# Patient Record
Sex: FEMALE | Race: WHITE | NOT HISPANIC OR LATINO | Employment: FULL TIME | URBAN - METROPOLITAN AREA
[De-identification: names, ages, dates, MRNs, and addresses within clinical notes are randomized per-mention and may not be internally consistent; named-entity substitution may affect disease eponyms.]

---

## 2017-02-23 ENCOUNTER — ALLSCRIPTS OFFICE VISIT (OUTPATIENT)
Dept: OTHER | Facility: OTHER | Age: 42
End: 2017-02-23

## 2017-09-07 ENCOUNTER — GENERIC CONVERSION - ENCOUNTER (OUTPATIENT)
Dept: OTHER | Facility: OTHER | Age: 42
End: 2017-09-07

## 2017-09-07 ENCOUNTER — ALLSCRIPTS OFFICE VISIT (OUTPATIENT)
Dept: OTHER | Facility: OTHER | Age: 42
End: 2017-09-07

## 2018-01-12 VITALS
HEIGHT: 65 IN | HEART RATE: 92 BPM | WEIGHT: 243.6 LBS | TEMPERATURE: 98.5 F | BODY MASS INDEX: 40.59 KG/M2 | SYSTOLIC BLOOD PRESSURE: 132 MMHG | DIASTOLIC BLOOD PRESSURE: 74 MMHG

## 2018-01-22 VITALS
DIASTOLIC BLOOD PRESSURE: 84 MMHG | BODY MASS INDEX: 41.09 KG/M2 | HEART RATE: 74 BPM | HEIGHT: 65 IN | SYSTOLIC BLOOD PRESSURE: 130 MMHG | WEIGHT: 246.6 LBS | TEMPERATURE: 97.3 F

## 2018-01-23 NOTE — PROGRESS NOTES
Assessment    1  Right knee pain (719 46) (M25 561)   2  Iliotibial band syndrome of right side (548 89) (M76 31)    Plan  Depression with anxiety    · Citalopram Hydrobromide 20 MG Oral Tablet; take 1 tablet by mouth daily  Right knee pain    · *1 - [facility] (ORTHOPEDIC SURGERY )  Co-Management  *  Status: Active  Requested for: [id number]  Care Summary provided  : Yes    Discussion/Summary    Right knee pain possible anserine bursitis vs ligamental tear/injury  I do feel there may be a IT band syndrome component given location of hip and leg pain and is separate from the knee pain  Ortho referral would be best and pt agreeable  The treatment plan was reviewed with the patient/guardian  The patient/guardian understands and agrees with the treatment plan      Chief Complaint  Pt is here with c/o knee pain  History of Present Illness  HPI: 2 months ago joined [employer]  Had been doing sprinting and squatting  Has pain in right hip down right leg into knee  Has not been working out  Keeps her up at night  Sharp stabbing pain  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  Sitting too long makes it stiff  Difficulty going up and down steps  has some swelling  No redness or warmth  Pain in located outside of right knee  Difficulty getting pants on  Review of Systems    Constitutional: no fever and no chills  Musculoskeletal: as noted in HPI  Active Problems    1  Abscess of groin (682 2) (L02 214)   2  Acute gastroenteritis (558 9) (K52 9)   3  Dental disorder (525 9) (K08 9)   4  Depression with anxiety (300 4) (F41 8)   5  Encounter for screening mammogram for malignant neoplasm of breast (V76 12)   (Z12 31)   6  Flu vaccine need (V04 81) (Z23)   7  Viral infection (079 99) (B34 9)    Past Medical History    1  Denied: History of Alcohol abuse   2  Dental disorder (525 9) (K08 9)   3  Denied: History of substance abuse   4   Denied: History of Mental health problem 5  Viral infection (152 47) (B34 9)    Family History  Mother    1  Denied: Family history of Alcohol abuse   2  Denied: Family history of substance abuse   3  Family history of Living and Healthy   4  Denied: Family history of Mental health problem  Father    5  Denied: Family history of Alcohol abuse   6  Denied: Family history of substance abuse   7  Denied: Family history of Mental health problem    Social History    · Never a smoker   · No drug use   · Remote social alcohol use  The social history was reviewed and updated today  The social history was reviewed and is unchanged  Surgical History    1  History of  Section    Current Meds   1  Citalopram Hydrobromide 20 MG Oral Tablet; take 1 tablet by mouth daily; Therapy: 83STL8231 to (Evaluate:2018)  Requested for: 72LZG0432; Last   Rx:[id number] Ordered    The medication list was reviewed and updated today  Allergies    1  No Known Drug Allergies    Vitals   Recorded: 2017 10:42AM   Temperature 97 3 F, Tympanic   Heart Rate 74, L Radial   Pulse Quality Regular, L Radial   Systolic 040, LUE, Sitting   Diastolic 84, LUE, Sitting   Height 5 ft 5 in   Weight 246 lb 9 6 oz   BMI Calculated 41 04   BSA Calculated 2 16     Physical Exam    Constitutional   General appearance: No acute distress, well appearing and well nourished  Musculoskeletal   Gait and station: Normal     Joints, bones, and muscles: Abnormal   Appearance - right knee and prepatellar medial swelling  swelling, but no right knee erythema, no right knee ecchymosis and no right knee deformity  Palpation - right knee and Located lateral aspect  tenderness, but no right knee increased warmth, no right knee click and no right knee crepitus  Range of motion: Abnormal   Range of Motion: Right Knee:  Full except as noted  Flexion: painful restricted AROM  Pain witl valgus and varus stress  Stability: Normal   no instability problems     Psychiatric   Orientation to person, place, and time: Normal     Mood and affect: Normal        Signatures   Electronically signed by : Yolanda Pulliam; Sep  7 2017 11:43AM EST                       (Author)    Electronically signed by : Yolette Garcia DO; Sep  7 2017 12:04PM EST                       (Author)

## 2018-06-08 DIAGNOSIS — F32.A DEPRESSION, UNSPECIFIED DEPRESSION TYPE: Primary | ICD-10-CM

## 2018-06-08 RX ORDER — CITALOPRAM 20 MG/1
1 TABLET ORAL DAILY
COMMUNITY
Start: 2015-02-17 | End: 2018-06-08 | Stop reason: SDUPTHER

## 2018-06-08 RX ORDER — CITALOPRAM 20 MG/1
20 TABLET ORAL DAILY
Qty: 30 TABLET | Refills: 0 | Status: SHIPPED | OUTPATIENT
Start: 2018-06-08 | End: 2018-07-26 | Stop reason: SDUPTHER

## 2018-06-08 NOTE — TELEPHONE ENCOUNTER
Pt didn't realize they had no refills left of citalopram, they ran out today, could we please renew to [facility] phone [phone]    DINAH AT [employer] making a f/u apt with us vinicius

## 2018-07-25 DIAGNOSIS — F32.A DEPRESSION, UNSPECIFIED DEPRESSION TYPE: ICD-10-CM

## 2018-07-25 RX ORDER — CITALOPRAM 20 MG/1
20 TABLET ORAL DAILY
Qty: 7 TABLET | Refills: 0 | Status: CANCELLED | OUTPATIENT
Start: 2018-07-25

## 2018-07-25 NOTE — TELEPHONE ENCOUNTER
Could we please send an emergency supply of citalopram to [facility]?  Pt took her last one today and her f/u is monday

## 2018-07-26 DIAGNOSIS — F32.A DEPRESSION, UNSPECIFIED DEPRESSION TYPE: ICD-10-CM

## 2018-07-26 RX ORDER — CITALOPRAM 20 MG/1
20 TABLET ORAL DAILY
Qty: 30 TABLET | Refills: 0 | Status: CANCELLED | OUTPATIENT
Start: 2018-07-26

## 2018-07-26 RX ORDER — CITALOPRAM 20 MG/1
20 TABLET ORAL DAILY
Qty: 30 TABLET | Refills: 0 | Status: SHIPPED | OUTPATIENT
Start: 2018-07-26 | End: 2018-09-12 | Stop reason: SDUPTHER

## 2018-07-26 NOTE — TELEPHONE ENCOUNTER
MEDICATION REFILL REQUEST WAS SENT TO [facility] - SHE IS OUT FOR THE WEEK - CAN WE PLEASE SEND TO OTHER PROVIDER TO APPROVE SINCE THE PATIENT IS OUT

## 2018-09-12 ENCOUNTER — OFFICE VISIT (OUTPATIENT)
Dept: FAMILY MEDICINE CLINIC | Facility: HOSPITAL | Age: 43
End: 2018-09-12
Payer: COMMERCIAL

## 2018-09-12 VITALS
SYSTOLIC BLOOD PRESSURE: 136 MMHG | HEART RATE: 78 BPM | BODY MASS INDEX: 42.2 KG/M2 | HEIGHT: 66 IN | WEIGHT: 262.6 LBS | DIASTOLIC BLOOD PRESSURE: 76 MMHG | TEMPERATURE: 97.5 F

## 2018-09-12 DIAGNOSIS — M25.561 ACUTE PAIN OF RIGHT KNEE: ICD-10-CM

## 2018-09-12 DIAGNOSIS — Z13.6 SCREENING FOR CARDIOVASCULAR CONDITION: ICD-10-CM

## 2018-09-12 DIAGNOSIS — Z12.31 ENCOUNTER FOR SCREENING MAMMOGRAM FOR MALIGNANT NEOPLASM OF BREAST: Primary | ICD-10-CM

## 2018-09-12 DIAGNOSIS — F41.1 GENERALIZED ANXIETY DISORDER: ICD-10-CM

## 2018-09-12 PROCEDURE — 3008F BODY MASS INDEX DOCD: CPT | Performed by: FAMILY MEDICINE

## 2018-09-12 PROCEDURE — 99213 OFFICE O/P EST LOW 20 MIN: CPT | Performed by: FAMILY MEDICINE

## 2018-09-12 RX ORDER — CITALOPRAM 40 MG/1
40 TABLET ORAL DAILY
Qty: 30 TABLET | Refills: 2 | Status: SHIPPED | OUTPATIENT
Start: 2018-09-12 | End: 2018-12-13 | Stop reason: SDUPTHER

## 2018-09-12 NOTE — PROGRESS NOTES
Assessment/Plan:    Problem List Items Addressed This Visit        Other    Acute pain of right knee    Relevant Orders    Ambulatory referral to Orthopedic Surgery    Generalized anxiety disorder    Relevant Medications    citalopram (CeleXA) 40 mg tablet    Other Relevant Orders    CBC    Comprehensive metabolic panel    TSH, 3rd generation with Free T4 reflex      Other Visit Diagnoses     Encounter for screening mammogram for malignant neoplasm of breast    -  Primary    Relevant Orders    Mammo screening bilateral w cad    Screening for cardiovascular condition        Relevant Orders    Lipid panel    CBC    Comprehensive metabolic panel        Ongoing KEVIN sytmpoms  Discussed options and agreed on a higher dose of celexa  No si/hi  Feeling safe  reeval in 3 months  Labs as outlined  Referral to Orthopedics as requested for ongoing right knee pain  No Follow-up on file  To call our office if any concerns/questions at [phone]   ______________________________________________________________________          Patient is here for follow up of chronic conditions  Chief Complaint   Patient presents with    Follow-up     Med check    Diarrhea    Nausea    Abdominal Pain       History of Present Illness:   Here for KEVIN  Has been on celexa and doing relatively well  However recently with more stressors at home and stressors at work her anxiety and nervousness have been uncontrolled  No depressed feelings, no si/hi  With ongoing pain in the left knee  Was not able to see orthopedics as recommended previously  With three days of nausea and diarrhea  diarhea ongoing but improved today  No fever, no chills  Feels feverish  No vomiting  No new food/food poisoning  No sick contacts  Review of Systems   Constitutional: Negative  Negative for activity change, appetite change, chills, diaphoresis, fatigue and fever     HENT: Negative for congestion, facial swelling and sore throat  Respiratory: Negative  Negative for apnea, cough, chest tightness and shortness of breath  Cardiovascular: Negative  Negative for chest pain and palpitations  Gastrointestinal: Positive for diarrhea and nausea  Negative for abdominal distention, abdominal pain, blood in stool and constipation  Genitourinary: Negative  Negative for difficulty urinating, dysuria, flank pain and frequency  Social History     Social History    Marital status: Single     Spouse name: N/A    Number of children: N/A    Years of education: N/A     Occupational History    Not on file  Social History Main Topics    Smoking status: Never Smoker    Smokeless tobacco: Never Used    Alcohol use Yes      Comment: Remote social alcohol use     Drug use: No    Sexual activity: Not on file     Other Topics Concern    Not on file     Social History Narrative    No narrative on file               No Known Allergies    There is no immunization history for the selected administration types on file for this patient  Vitals:    09/12/18 0825   BP: 136/76   Pulse: 78   Temp: 97 5 °F (36 4 °C)     Body mass index is 43 03 kg/m²  Physical Exam   Constitutional: She appears well-developed and well-nourished  No distress  HENT:   Head: Normocephalic and atraumatic  Eyes: Conjunctivae and EOM are normal  Pupils are equal, round, and reactive to light  Neck: Normal range of motion  Neck supple  Cardiovascular: Normal rate, regular rhythm and normal heart sounds  Exam reveals no gallop and no friction rub  No murmur heard  Pulmonary/Chest: Effort normal and breath sounds normal  No respiratory distress  She has no wheezes  She has no rales  Abdominal: Soft  Bowel sounds are normal  She exhibits no distension and no mass  There is no tenderness  There is no rebound and no guarding  No hernia  Skin: Skin is warm and dry  Capillary refill takes less than 2 seconds  No rash noted   She is not diaphoretic  No erythema  Psychiatric: She has a normal mood and affect  Nursing note and vitals reviewed      Diabetic Foot Exam                Health Maintenance Due   Topic Date Due    DTaP,Tdap,and Td Vaccines (1 - Tdap) 08/19/1995    PAP SMEAR  08/19/1995    MAMMOGRAM  05/03/2013    INFLUENZA VACCINE  09/01/2018

## 2018-09-12 NOTE — LETTER
September 12, 2018     Patient: Azalia Cerda   YOB: 1975   Date of Visit: 9/12/2018       To Whom it May Concern:    John Matthews is under my professional care  She was seen in my office on 9/12/2018  She may return to work on 9/13/2018  Please excuse her from work from September 10-12, 2018  If you have any questions or concerns, please don't hesitate to call           Sincerely,          Dena Terrazas MD        CC: No Recipients

## 2018-09-14 ENCOUNTER — TELEPHONE (OUTPATIENT)
Dept: FAMILY MEDICINE CLINIC | Facility: HOSPITAL | Age: 43
End: 2018-09-14

## 2018-09-14 LAB
ALBUMIN SERPL-MCNC: 4.2 G/DL (ref 3.6–5.1)
ALBUMIN/GLOB SERPL: 1.8 (CALC) (ref 1–2.5)
ALP SERPL-CCNC: 52 U/L (ref 33–115)
ALT SERPL-CCNC: 8 U/L (ref 6–29)
AST SERPL-CCNC: 10 U/L (ref 10–30)
BILIRUB SERPL-MCNC: 0.4 MG/DL (ref 0.2–1.2)
BUN SERPL-MCNC: 12 MG/DL (ref 7–25)
BUN/CREAT SERPL: ABNORMAL (CALC) (ref 6–22)
CALCIUM SERPL-MCNC: 9.4 MG/DL (ref 8.6–10.2)
CHLORIDE SERPL-SCNC: 106 MMOL/L (ref 98–110)
CHOLEST SERPL-MCNC: 218 MG/DL
CHOLEST/HDLC SERPL: 4.2 (CALC)
CO2 SERPL-SCNC: 28 MMOL/L (ref 20–32)
CREAT SERPL-MCNC: 0.84 MG/DL (ref 0.5–1.1)
ERYTHROCYTE [DISTWIDTH] IN BLOOD BY AUTOMATED COUNT: 12.9 % (ref 11–15)
GLOBULIN SER CALC-MCNC: 2.3 G/DL (CALC) (ref 1.9–3.7)
GLUCOSE SERPL-MCNC: 102 MG/DL (ref 65–99)
HCT VFR BLD AUTO: 35.9 % (ref 35–45)
HDLC SERPL-MCNC: 52 MG/DL
HGB BLD-MCNC: 11.5 G/DL (ref 11.7–15.5)
LDLC SERPL CALC-MCNC: 143 MG/DL (CALC)
MCH RBC QN AUTO: 28.5 PG (ref 27–33)
MCHC RBC AUTO-ENTMCNC: 32 G/DL (ref 32–36)
MCV RBC AUTO: 88.9 FL (ref 80–100)
NONHDLC SERPL-MCNC: 166 MG/DL (CALC)
PLATELET # BLD AUTO: 314 THOUSAND/UL (ref 140–400)
PMV BLD REES-ECKER: 10 FL (ref 7.5–12.5)
POTASSIUM SERPL-SCNC: 4.6 MMOL/L (ref 3.5–5.3)
PROT SERPL-MCNC: 6.5 G/DL (ref 6.1–8.1)
RBC # BLD AUTO: 4.04 MILLION/UL (ref 3.8–5.1)
SL AMB EGFR AFRICAN AMERICAN: 98 ML/MIN/1.73M2
SL AMB EGFR NON AFRICAN AMERICAN: 85 ML/MIN/1.73M2
SODIUM SERPL-SCNC: 141 MMOL/L (ref 135–146)
TRIGL SERPL-MCNC: 115 MG/DL
TSH SERPL-ACNC: 0.75 MIU/L
WBC # BLD AUTO: 6.1 THOUSAND/UL (ref 3.8–10.8)

## 2018-09-17 ENCOUNTER — ANNUAL EXAM (OUTPATIENT)
Dept: FAMILY MEDICINE CLINIC | Facility: HOSPITAL | Age: 43
End: 2018-09-17
Payer: COMMERCIAL

## 2018-09-17 VITALS
BODY MASS INDEX: 41.85 KG/M2 | WEIGHT: 260.4 LBS | DIASTOLIC BLOOD PRESSURE: 70 MMHG | TEMPERATURE: 97.8 F | SYSTOLIC BLOOD PRESSURE: 102 MMHG | HEIGHT: 66 IN | HEART RATE: 82 BPM

## 2018-09-17 DIAGNOSIS — Z23 FLU VACCINE NEED: ICD-10-CM

## 2018-09-17 DIAGNOSIS — Z23 NEED FOR TETANUS, DIPHTHERIA, AND ACELLULAR PERTUSSIS (TDAP) VACCINE: Primary | ICD-10-CM

## 2018-09-17 DIAGNOSIS — Z01.419 ENCOUNTER FOR GYNECOLOGICAL EXAMINATION WITHOUT ABNORMAL FINDING: ICD-10-CM

## 2018-09-17 PROCEDURE — 90471 IMMUNIZATION ADMIN: CPT

## 2018-09-17 PROCEDURE — 99396 PREV VISIT EST AGE 40-64: CPT | Performed by: FAMILY MEDICINE

## 2018-09-17 PROCEDURE — 90472 IMMUNIZATION ADMIN EACH ADD: CPT

## 2018-09-17 PROCEDURE — 90715 TDAP VACCINE 7 YRS/> IM: CPT

## 2018-09-17 PROCEDURE — 90686 IIV4 VACC NO PRSV 0.5 ML IM: CPT

## 2018-09-17 NOTE — LETTER
September 17, 2018     Patient: Krysta Borjas   YOB: 1975   Date of Visit: 9/17/2018       To Whom it May Concern:    Carola Mcintosh is under my professional care  She was seen in my office on 9/17/2018  She may return to work on 9/17/2018  If you have any questions or concerns, please don't hesitate to call           Sincerely,          Raven Maya MD        CC: No Recipients

## 2018-09-17 NOTE — PROGRESS NOTES
Assessment/Plan:        Problem List Items Addressed This Visit     None      Visit Diagnoses     Need for tetanus, diphtheria, and acellular pertussis (Tdap) vaccine    -  Primary    Relevant Orders    TDAP VACCINE GREATER THAN OR EQUAL TO 8YO IM (Completed)    Flu vaccine need        Relevant Orders    SYRINGE/SINGLE-DOSE VIAL: influenza vaccine, 5163-3228, quadrivalent, 0 5 mL, preservative-free, for patients 3+ yr (FLUZONE) (Completed)    Encounter for gynecological examination without abnormal finding            Send pap/hpv  If negative repeat in 5 years  To call our office if any concerns/questions at [phone]       ______________________________________________________________      Chief Complaint   Patient presents with    Gynecologic Exam       History of Present Illness:  Here for follow up of      Had 2 c/s  No hx of ovarian or uterine problems  No hx of abnormal Pap smears  Fam Hx of gmother with ovarian cancer  Will be scheduling for mammogram    LMP was a few weeks ago  Normal menstrual cycle without dysmenorrhea  Heavy in the first few days  Review of Systems   Respiratory: Negative  Cardiovascular: Negative  Genitourinary: Negative for decreased urine volume, difficulty urinating, dyspareunia, genital sores, hematuria, menstrual problem, pelvic pain, urgency, vaginal bleeding, vaginal discharge and vaginal pain  Social History     Social History    Marital status: Single     Spouse name: N/A    Number of children: N/A    Years of education: N/A     Occupational History    Not on file       Social History Main Topics    Smoking status: Never Smoker    Smokeless tobacco: Never Used    Alcohol use Yes      Comment: Remote social alcohol use     Drug use: No    Sexual activity: Not on file     Other Topics Concern    Not on file     Social History Narrative    No narrative on file               No Known Allergies        Vitals:    18 0840   BP: 102/70   Pulse: 82   Temp: 97 8 °F (36 6 °C)     Body mass index is 42 67 kg/m²  Physical Exam   Constitutional: She appears well-developed and well-nourished  Genitourinary: Rectum normal, vagina normal and uterus normal  Pelvic exam was performed with patient supine  There is no rash or tenderness on the right labia  There is no rash, tenderness, lesion or injury on the left labia  Cervix exhibits no motion tenderness, no discharge and no friability  Right adnexum displays no mass and no tenderness  Left adnexum displays no mass and no tenderness  Nursing note and vitals reviewed  No Follow-up on file

## 2018-09-22 LAB
CLINICAL INFO: NORMAL
CYTO CVX: NORMAL
DATE PREVIOUS BX: NORMAL
HPV I/H RISK 1 DNA CVX QL PROBE+SIG AMP: NOT DETECTED
LMP START DATE: NORMAL
QUESTION/PROBLEM: NORMAL
SL AMB CONTAINER TYPE: NORMAL
SL AMB FINAL RESOLUTION: NORMAL
SL AMB PREV. PAP:: NORMAL
SL AMB REPORT STATUS: NORMAL
SPECIMEN SOURCE CVX/VAG CYTO: NORMAL

## 2018-12-13 ENCOUNTER — OFFICE VISIT (OUTPATIENT)
Dept: FAMILY MEDICINE CLINIC | Facility: HOSPITAL | Age: 43
End: 2018-12-13
Payer: COMMERCIAL

## 2018-12-13 VITALS
WEIGHT: 264.2 LBS | BODY MASS INDEX: 42.46 KG/M2 | HEART RATE: 93 BPM | SYSTOLIC BLOOD PRESSURE: 110 MMHG | DIASTOLIC BLOOD PRESSURE: 78 MMHG | TEMPERATURE: 96.4 F | HEIGHT: 66 IN

## 2018-12-13 DIAGNOSIS — B35.1 ONYCHOMYCOSIS: Primary | ICD-10-CM

## 2018-12-13 DIAGNOSIS — F41.1 GENERALIZED ANXIETY DISORDER: ICD-10-CM

## 2018-12-13 PROCEDURE — 3008F BODY MASS INDEX DOCD: CPT | Performed by: FAMILY MEDICINE

## 2018-12-13 PROCEDURE — 1036F TOBACCO NON-USER: CPT | Performed by: FAMILY MEDICINE

## 2018-12-13 PROCEDURE — 99213 OFFICE O/P EST LOW 20 MIN: CPT | Performed by: FAMILY MEDICINE

## 2018-12-13 RX ORDER — CITALOPRAM 40 MG/1
40 TABLET ORAL DAILY
Qty: 90 TABLET | Refills: 0 | Status: SHIPPED | OUTPATIENT
Start: 2018-12-13 | End: 2019-06-10 | Stop reason: SDUPTHER

## 2018-12-13 NOTE — PROGRESS NOTES
Assessment/Plan:        Problem List Items Addressed This Visit        Other    Generalized anxiety disorder    Relevant Medications    citalopram (CeleXA) 40 mg tablet      Other Visit Diagnoses     Onychomycosis    -  Primary    Relevant Medications    Efinaconazole 10 % SOLN        Patient is doing well with her current regimen  Discussed her medication and regimen  Will continue with Celexa 40 mg once a day  Will follow up in 3 months  With plans of decreasing her dosing at that time  With a thick discolored fingernails on the right hand  Likely onychomycosis  Discussed risks for use of oral lamisil  Discussed and agreed on trial of topical jublia to use for 48 weeks rather than the potential systemic effects of oral lamisil  To call our office if any concerns/questions at [phone]       ______________________________________________________________      Chief Complaint   Patient presents with    Follow-up     3 month        History of Present Illness:  Here for follow up of    Generalized anxiety disorder  She is doing well on Celexa 40 mg daily  Reports she has been dealing with her stressors  Have been working things out with her family  No panic attacks  Her anxiety is better controlled  No depressive thoughts  No suicidal ideation  No homicidal ideation  Review of Systems   Constitutional: Negative  Negative for activity change, appetite change, chills, diaphoresis, fatigue and fever  HENT: Negative for congestion, facial swelling and sore throat  Respiratory: Negative  Negative for apnea, cough, chest tightness and shortness of breath  Cardiovascular: Negative  Negative for chest pain and palpitations  Gastrointestinal: Negative  Negative for abdominal distention, abdominal pain, blood in stool, constipation, diarrhea and nausea  Genitourinary: Negative  Negative for difficulty urinating, dysuria, flank pain and frequency         Social History     Social History  Marital status: Single     Spouse name: N/A    Number of children: N/A    Years of education: N/A     Occupational History    Not on file  Social History Main Topics    Smoking status: Never Smoker    Smokeless tobacco: Never Used    Alcohol use Yes      Comment: Remote social alcohol use     Drug use: No    Sexual activity: Not on file     Other Topics Concern    Not on file     Social History Narrative    No narrative on file               No Known Allergies        Vitals:    12/13/18 0807   BP: 110/78   Pulse: 93   Temp: (!) 96 4 °F (35 8 °C)     Body mass index is 42 64 kg/m²  Physical Exam   Constitutional: She appears well-developed and well-nourished  HENT:   Head: Normocephalic and atraumatic  Eyes: Pupils are equal, round, and reactive to light  Conjunctivae and EOM are normal    Neck: Normal range of motion  Neck supple  Cardiovascular: Normal rate, regular rhythm and normal heart sounds  Pulmonary/Chest: Effort normal and breath sounds normal    Abdominal: Soft  Bowel sounds are normal    Skin: Skin is warm and dry  Psychiatric: She has a normal mood and affect  Nursing note and vitals reviewed  Return in about 3 months (around 3/13/2019)

## 2019-01-05 ENCOUNTER — OFFICE VISIT (OUTPATIENT)
Dept: URGENT CARE | Facility: CLINIC | Age: 44
End: 2019-01-05
Payer: COMMERCIAL

## 2019-01-05 ENCOUNTER — TELEPHONE (OUTPATIENT)
Dept: FAMILY MEDICINE CLINIC | Facility: HOSPITAL | Age: 44
End: 2019-01-05

## 2019-01-05 VITALS
OXYGEN SATURATION: 97 % | SYSTOLIC BLOOD PRESSURE: 140 MMHG | DIASTOLIC BLOOD PRESSURE: 90 MMHG | WEIGHT: 260 LBS | HEIGHT: 66 IN | RESPIRATION RATE: 16 BRPM | TEMPERATURE: 98.8 F | HEART RATE: 65 BPM | BODY MASS INDEX: 41.78 KG/M2

## 2019-01-05 DIAGNOSIS — K04.7 DENTAL ABSCESS: Primary | ICD-10-CM

## 2019-01-05 PROCEDURE — 99213 OFFICE O/P EST LOW 20 MIN: CPT | Performed by: FAMILY MEDICINE

## 2019-01-05 RX ORDER — AMOXICILLIN 500 MG/1
500 CAPSULE ORAL EVERY 8 HOURS SCHEDULED
Qty: 30 CAPSULE | Refills: 0 | Status: SHIPPED | OUTPATIENT
Start: 2019-01-05 | End: 2019-01-15

## 2019-01-05 NOTE — PATIENT INSTRUCTIONS
Amoxicillin as directed for 10 days  Follow-up with [facility] as scheduled  Saltwater rinses  Ibuprofen as needed for discomfort

## 2019-01-05 NOTE — PROGRESS NOTES
[facility]        NAME: Miriam Paula is a 39 y o  female  : 1975    MRN: [id number]  DATE: 2019  TIME: 11:37 AM    Assessment and Plan   Dental abscess [K04 7]  1  Dental abscess  amoxicillin (AMOXIL) 500 mg capsule         Patient Instructions   Patient Instructions   Amoxicillin as directed for 10 days  [facility]  Saltwater rinses  Ibuprofen as needed for discomfort        Proceed to  ER if symptoms worsen  Chief Complaint     Chief Complaint   Patient presents with    Dental Pain     RLQ broken tooth  Believes abscess  Called pcp and recommended urgent care  History of Present Illness       Patient presents with pain and swelling in the right lower aspect of the jaw, she does have known poor dentition and has been going to [facility] to have work done  She has pain radiating from the lower angle of the mandible the jaw radiating up to the right ear, she states she does have a known fractured tooth in the lower incisor  No fever no chills  She did contact her PCP who would not prescribe antibiotics over the phone and suggested she be seen at urgent care prior to evaluation by dentist         Review of Systems   Review of Systems   Constitutional: Negative  HENT: Positive for dental problem  Negative for mouth sores  Respiratory: Negative  Cardiovascular: Negative            Current Medications       Current Outpatient Prescriptions:     citalopram (CeleXA) 40 mg tablet, Take 1 tablet (40 mg total) by mouth daily, Disp: 90 tablet, Rfl: 0    amoxicillin (AMOXIL) 500 mg capsule, Take 1 capsule (500 mg total) by mouth every 8 (eight) hours for 10 days, Disp: 30 capsule, Rfl: 0    Efinaconazole 10 % SOLN, Apply 1 application topically daily for 336 days (Patient not taking: Reported on 2019 ), Disp: 1 Bottle, Rfl: 1    Current Allergies     Allergies as of 2019    (No Known Allergies)            The following portions of the patient's history were reviewed and updated as appropriate: allergies, current medications, past family history, past medical history, past social history, past surgical history and problem list      Past Medical History:   Diagnosis Date    Dental disorder     last assessed: Dec 4, 2014    Generalized anxiety disorder 2018       Past Surgical History:   Procedure Laterality Date     SECTION      x2       Family History   Problem Relation Age of Onset    No Known Problems Mother          Medications have been verified  Objective   /90   Pulse 65   Temp 98 8 °F (37 1 °C)   Resp 16   Ht 5' 6" (1 676 m)   Wt 118 kg (260 lb)   SpO2 97%   BMI 41 97 kg/m²        Physical Exam     Physical Exam   HENT:   Gingival swelling surrounding fractured right lower jaw incisor, quite tender to percussion    TM clear bilaterally pharynx clear otherwise

## 2019-01-16 ENCOUNTER — APPOINTMENT (OUTPATIENT)
Dept: RADIOLOGY | Facility: CLINIC | Age: 44
End: 2019-01-16
Payer: COMMERCIAL

## 2019-01-16 ENCOUNTER — OFFICE VISIT (OUTPATIENT)
Dept: OBGYN CLINIC | Facility: CLINIC | Age: 44
End: 2019-01-16
Payer: COMMERCIAL

## 2019-01-16 VITALS
SYSTOLIC BLOOD PRESSURE: 128 MMHG | HEART RATE: 68 BPM | DIASTOLIC BLOOD PRESSURE: 91 MMHG | WEIGHT: 261.5 LBS | HEIGHT: 66 IN | BODY MASS INDEX: 42.02 KG/M2

## 2019-01-16 DIAGNOSIS — M25.461 EFFUSION OF RIGHT KNEE: ICD-10-CM

## 2019-01-16 DIAGNOSIS — M25.561 ACUTE PAIN OF RIGHT KNEE: ICD-10-CM

## 2019-01-16 DIAGNOSIS — M25.561 RIGHT KNEE PAIN, UNSPECIFIED CHRONICITY: Primary | ICD-10-CM

## 2019-01-16 DIAGNOSIS — M25.561 RIGHT KNEE PAIN, UNSPECIFIED CHRONICITY: ICD-10-CM

## 2019-01-16 PROCEDURE — 99244 OFF/OP CNSLTJ NEW/EST MOD 40: CPT | Performed by: FAMILY MEDICINE

## 2019-01-16 PROCEDURE — 73564 X-RAY EXAM KNEE 4 OR MORE: CPT

## 2019-01-16 NOTE — PROGRESS NOTES
Assessment:     1  Right knee pain, unspecified chronicity    2  Acute pain of right knee    3  Effusion of right knee        Plan:     Problem List Items Addressed This Visit     Acute pain of right knee    Relevant Medications    diclofenac sodium (VOLTAREN) 1 %    Other Relevant Orders    MRI knee right  wo contrast    Effusion of right knee    Relevant Medications    diclofenac sodium (VOLTAREN) 1 %    Other Relevant Orders    MRI knee right  wo contrast      Other Visit Diagnoses     Right knee pain, unspecified chronicity    -  Primary    Relevant Medications    diclofenac sodium (VOLTAREN) 1 %    Other Relevant Orders    XR knee 4+ vw right injury (Completed)    MRI knee right  wo contrast         Subjective:     Patient ID: Gi Leon is a 39 y o  female  Chief Complaint:  Patient is a 76-year-old female presenting today for evaluation of right knee pain  She reports noticing worsening of knee pain in the past few weeks to month  Pain is located along the anterior aspect of the knee with radiation of symptoms of pain into the medial lateral aspects  She does report beginning with a high intensity exercise regimen consisting of running and lifting weights which seems to have made her pain worse  She has since stopped this exercising program in the past few weeks and reports no noted improvement of symptoms  She does report occasional locking and frequent clicking in the knee but denies any giving out of the knee  Pain is also reproduced when climbing stairs and when rising from a prolonged sitting position  She denies any swelling  She denies any crepitus, warmth, numbness or tingling        Allergy:  No Known Allergies  Medications:  all current active meds have been reviewed  Past Medical History:  Past Medical History:   Diagnosis Date    Dental disorder     last assessed: Dec 4, 2014    Generalized anxiety disorder 9/12/2018     Past Surgical History:  Past Surgical History:   Procedure Laterality Date     SECTION      x2     Family History:  Family History   Problem Relation Age of Onset    No Known Problems Mother      Social History:  History   Alcohol Use    Yes     Comment: Remote social alcohol use      History   Drug Use No     History   Smoking Status    Never Smoker   Smokeless Tobacco    Never Used     Review of Systems   Constitutional: Negative  HENT: Negative  Eyes: Negative  Respiratory: Negative  Cardiovascular: Negative  Gastrointestinal: Negative  Genitourinary: Negative  Musculoskeletal: Positive for arthralgias and myalgias  Skin: Negative  Allergic/Immunologic: Negative  Neurological: Negative  Hematological: Negative  Psychiatric/Behavioral: Negative  Objective:  BP Readings from Last 1 Encounters:   19 128/91      Wt Readings from Last 1 Encounters:   19 119 kg (261 lb 8 oz)      BMI:   Estimated body mass index is 42 21 kg/m² as calculated from the following:    Height as of this encounter: 5' 6" (1 676 m)  Weight as of this encounter: 119 kg (261 lb 8 oz)  BSA:   Estimated body surface area is 2 24 meters squared as calculated from the following:    Height as of this encounter: 5' 6" (1 676 m)  Weight as of this encounter: 119 kg (261 lb 8 oz)  Physical Exam   Constitutional: She is oriented to person, place, and time  Vital signs are normal  She appears well-developed  HENT:   Head: Normocephalic  Eyes: Pupils are equal, round, and reactive to light  Pulmonary/Chest: Effort normal    Musculoskeletal: Normal range of motion  Right knee: She exhibits effusion  Neurological: She is alert and oriented to person, place, and time  Skin: Skin is warm and dry  Psychiatric: She has a normal mood and affect  Nursing note and vitals reviewed  Right Knee Exam     Tenderness   The patient is experiencing tenderness in the lateral joint line and medial joint line      Range of Motion Extension: normal   Flexion: normal     Tests   Jose:  Medial - negative Lateral - positive  Lachman:  Anterior - negative    Posterior - negative  Drawer:       Anterior - negative    Posterior - negative  Varus: positive  Valgus: negative  Pivot Shift: negative  Patellar Apprehension: positive    Other   Erythema: absent  Sensation: normal  Pulse: present  Swelling: mild  Other tests: effusion present      Left Knee Exam   Left knee exam is normal     Range of Motion   The patient has normal left knee ROM  Muscle Strength     The patient has normal left knee strength  I have personally reviewed pertinent films in PACS  No acute osseous abnormalities  There is an accessory ossicle at the tibial tuberosity  No joint effusions

## 2019-01-24 ENCOUNTER — HOSPITAL ENCOUNTER (OUTPATIENT)
Dept: MRI IMAGING | Facility: HOSPITAL | Age: 44
Discharge: HOME | End: 2019-01-24
Attending: FAMILY MEDICINE
Payer: COMMERCIAL

## 2019-01-24 ENCOUNTER — TELEPHONE (OUTPATIENT)
Dept: FAMILY MEDICINE CLINIC | Facility: HOSPITAL | Age: 44
End: 2019-01-24

## 2019-01-24 DIAGNOSIS — M25.461 EFFUSION OF RIGHT KNEE: ICD-10-CM

## 2019-01-24 DIAGNOSIS — M25.561 ACUTE PAIN OF RIGHT KNEE: ICD-10-CM

## 2019-01-24 DIAGNOSIS — M25.561 RIGHT KNEE PAIN, UNSPECIFIED CHRONICITY: ICD-10-CM

## 2019-01-24 PROCEDURE — 73721 MRI JNT OF LWR EXTRE W/O DYE: CPT

## 2019-01-25 NOTE — TELEPHONE ENCOUNTER
Please call  As we had reviewed before another alternative is oral lamisil  Need to be careful with liver /liver disease when using this medications  Treatment would be for 12 weeks

## 2019-01-29 ENCOUNTER — OFFICE VISIT (OUTPATIENT)
Dept: OBGYN CLINIC | Facility: CLINIC | Age: 44
End: 2019-01-29
Payer: COMMERCIAL

## 2019-01-29 VITALS
HEIGHT: 66 IN | WEIGHT: 263 LBS | HEART RATE: 64 BPM | SYSTOLIC BLOOD PRESSURE: 115 MMHG | DIASTOLIC BLOOD PRESSURE: 80 MMHG | BODY MASS INDEX: 42.27 KG/M2

## 2019-01-29 DIAGNOSIS — S83.261A PERIPHERAL TEAR OF LATERAL MENISCUS OF RIGHT KNEE AS CURRENT INJURY, INITIAL ENCOUNTER: ICD-10-CM

## 2019-01-29 DIAGNOSIS — S83.241A OTHER TEAR OF MEDIAL MENISCUS, CURRENT INJURY, RIGHT KNEE, INITIAL ENCOUNTER: ICD-10-CM

## 2019-01-29 DIAGNOSIS — M25.561 ACUTE PAIN OF RIGHT KNEE: Primary | ICD-10-CM

## 2019-01-29 PROCEDURE — 20610 DRAIN/INJ JOINT/BURSA W/O US: CPT | Performed by: FAMILY MEDICINE

## 2019-01-29 PROCEDURE — 99214 OFFICE O/P EST MOD 30 MIN: CPT | Performed by: FAMILY MEDICINE

## 2019-01-29 RX ORDER — METHYLPREDNISOLONE ACETATE 40 MG/ML
1 INJECTION, SUSPENSION INTRA-ARTICULAR; INTRALESIONAL; INTRAMUSCULAR; SOFT TISSUE
Status: COMPLETED | OUTPATIENT
Start: 2019-01-29 | End: 2019-01-29

## 2019-01-29 RX ORDER — LIDOCAINE HYDROCHLORIDE 10 MG/ML
4 INJECTION, SOLUTION INFILTRATION; PERINEURAL
Status: COMPLETED | OUTPATIENT
Start: 2019-01-29 | End: 2019-01-29

## 2019-01-29 RX ADMIN — LIDOCAINE HYDROCHLORIDE 4 ML: 10 INJECTION, SOLUTION INFILTRATION; PERINEURAL at 08:54

## 2019-01-29 RX ADMIN — METHYLPREDNISOLONE ACETATE 1 ML: 40 INJECTION, SUSPENSION INTRA-ARTICULAR; INTRALESIONAL; INTRAMUSCULAR; SOFT TISSUE at 08:54

## 2019-01-29 NOTE — PROGRESS NOTES
Assessment:     1  Acute pain of right knee    2  Peripheral tear of lateral meniscus of right knee as current injury, initial encounter    3  Other tear of medial meniscus, current injury, right knee, initial encounter        Plan:     Problem List Items Addressed This Visit     Acute pain of right knee - Primary     MRI results have been reviewed and discussed with the patient  I have explained to the patient the options of conservative management with cortisone injection today verses surgical meniscectomy  Today she will opt for a cortisone injection in hopes of alleviating the pain that she is 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  I have explained to the patient also down the road that PRP would be a viable option given her multiple menisci tears  I have provided her with a hinged knee brace that she may pickup today  Follow-up in the future as needed for any further concerns or questions  Relevant Orders    Durable Medical Equipment    Other tear of medial meniscus, current injury, right knee, initial encounter    Relevant Orders    Durable Medical Equipment    Peripheral tear of lateral meniscus of right knee as current injury    Relevant Orders    Durable Medical Equipment         Subjective:     Patient ID: Donna Brothers is a 39 y o  female  Chief Complaint:  Patient presents today for follow-up of her right knee pain and MRI results  She reports ongoing pain in the right knee made worse with prolonged standing or ambulation  Pain is described as a cutting knife leg sensation in the anterior aspect the knee that radiates into the lower leg  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 and expresses concerns about being ready for though shows with the current status of her knee          Allergy:  No Known Allergies  Medications:  all current active meds have been reviewed  Past Medical History:  Past Medical History:   Diagnosis Date    Dental disorder     last assessed: Dec 4, 2014    Generalized anxiety disorder 2018     Past Surgical History:  Past Surgical History:   Procedure Laterality Date     SECTION      x2     Family History:  Family History   Problem Relation Age of Onset    No Known Problems Mother      Social History:  History   Alcohol Use    Yes     Comment: Remote social alcohol use      History   Drug Use No     History   Smoking Status    Never Smoker   Smokeless Tobacco    Never Used     Review of Systems   Constitutional: Negative  HENT: Negative  Eyes: Negative  Respiratory: Negative  Cardiovascular: Negative  Gastrointestinal: Negative  Genitourinary: Negative  Musculoskeletal: Positive for arthralgias and myalgias  Skin: Negative  Allergic/Immunologic: Negative  Neurological: Negative  Hematological: Negative  Psychiatric/Behavioral: Negative  Objective:  BP Readings from Last 1 Encounters:   19 115/80      Wt Readings from Last 1 Encounters:   19 119 kg (263 lb)      BMI:   Estimated body mass index is 42 45 kg/m² as calculated from the following:    Height as of this encounter: 5' 6" (1 676 m)  Weight as of this encounter: 119 kg (263 lb)  BSA:   Estimated body surface area is 2 24 meters squared as calculated from the following:    Height as of this encounter: 5' 6" (1 676 m)  Weight as of this encounter: 119 kg (263 lb)  Physical Exam   Constitutional: She is oriented to person, place, and time  Vital signs are normal  She appears well-developed  HENT:   Head: Normocephalic  Eyes: Pupils are equal, round, and reactive to light  Pulmonary/Chest: Effort normal    Musculoskeletal: Normal range of motion  Right knee: She exhibits effusion  Neurological: She is alert and oriented to person, place, and time  Skin: Skin is warm and dry  Psychiatric: She has a normal mood and affect  Nursing note and vitals reviewed      Right Knee Exam     Tenderness The patient is experiencing tenderness in the lateral joint line and medial joint line  Range of Motion   Extension: normal   Flexion: normal     Tests   Jose:  Medial - negative Lateral - positive  Lachman:  Anterior - negative    Posterior - negative  Drawer:       Anterior - negative    Posterior - negative  Varus: positive  Valgus: negative  Pivot Shift: negative  Patellar Apprehension: positive    Other   Erythema: absent  Sensation: normal  Pulse: present  Swelling: mild  Other tests: effusion present      Left Knee Exam   Left knee exam is normal     Range of Motion   The patient has normal left knee ROM  Muscle Strength     The patient has normal left knee strength  I have personally reviewed pertinent films in PACS  There is a small horizontal undersurface tear of the medial meniscus at the junction of the body in the posterior horn  Additionally there is also a small horizontal tear along the lateral meniscus at the junction of the body and anterior horn    Large joint arthrocentesis  Date/Time: 1/29/2019 8:54 AM  Site marked: site marked  Supporting Documentation  Indications: pain and joint swelling   Procedure Details  Location: knee - R knee  Preparation: Patient was prepped and draped in the usual sterile fashion  Needle size: 22 G  Approach: anterolateral  Medications administered: 4 mL lidocaine 1 %; 1 mL methylPREDNISolone acetate 40 mg/mL    Patient tolerance: patient tolerated the procedure well with no immediate complications  Dressing:  Sterile dressing applied

## 2019-01-29 NOTE — ASSESSMENT & PLAN NOTE
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

## 2019-04-19 ENCOUNTER — OFFICE VISIT (OUTPATIENT)
Dept: OBGYN CLINIC | Facility: CLINIC | Age: 44
End: 2019-04-19
Payer: COMMERCIAL

## 2019-04-19 VITALS
DIASTOLIC BLOOD PRESSURE: 72 MMHG | BODY MASS INDEX: 41.78 KG/M2 | HEIGHT: 66 IN | SYSTOLIC BLOOD PRESSURE: 130 MMHG | WEIGHT: 260 LBS

## 2019-04-19 DIAGNOSIS — M17.11 OSTEOARTHROSIS, LOCALIZED, PRIMARY, KNEE, RIGHT: Primary | ICD-10-CM

## 2019-04-19 PROCEDURE — 99203 OFFICE O/P NEW LOW 30 MIN: CPT | Performed by: ORTHOPAEDIC SURGERY

## 2019-06-10 ENCOUNTER — OFFICE VISIT (OUTPATIENT)
Dept: FAMILY MEDICINE CLINIC | Facility: HOSPITAL | Age: 44
End: 2019-06-10
Payer: COMMERCIAL

## 2019-06-10 VITALS
WEIGHT: 265 LBS | TEMPERATURE: 98.9 F | HEIGHT: 66 IN | DIASTOLIC BLOOD PRESSURE: 80 MMHG | SYSTOLIC BLOOD PRESSURE: 118 MMHG | BODY MASS INDEX: 42.59 KG/M2 | HEART RATE: 81 BPM

## 2019-06-10 DIAGNOSIS — F41.1 GENERALIZED ANXIETY DISORDER: Primary | ICD-10-CM

## 2019-06-10 DIAGNOSIS — E66.01 MORBID OBESITY (HCC): ICD-10-CM

## 2019-06-10 PROCEDURE — 99213 OFFICE O/P EST LOW 20 MIN: CPT | Performed by: FAMILY MEDICINE

## 2019-06-10 PROCEDURE — 3008F BODY MASS INDEX DOCD: CPT | Performed by: FAMILY MEDICINE

## 2019-06-10 PROCEDURE — 1036F TOBACCO NON-USER: CPT | Performed by: FAMILY MEDICINE

## 2019-06-10 RX ORDER — CITALOPRAM 40 MG/1
40 TABLET ORAL DAILY
Qty: 90 TABLET | Refills: 1 | Status: SHIPPED | OUTPATIENT
Start: 2019-06-10 | End: 2020-03-18 | Stop reason: SDUPTHER

## 2020-03-18 DIAGNOSIS — F41.1 GENERALIZED ANXIETY DISORDER: ICD-10-CM

## 2020-03-18 RX ORDER — CITALOPRAM 40 MG/1
40 TABLET ORAL DAILY
Qty: 90 TABLET | Refills: 1 | Status: SHIPPED | OUTPATIENT
Start: 2020-03-18 | End: 2020-06-29 | Stop reason: SDUPTHER

## 2020-03-18 NOTE — TELEPHONE ENCOUNTER
Requested medication(s) are due for refill today: Yes  Patient has already received a courtesy refill: No  Other reason request has been forwarded to provider: Patient is aware she is due for appointment   Just got new insurance and will schedule once it kicks in

## 2020-06-29 DIAGNOSIS — F41.1 GENERALIZED ANXIETY DISORDER: ICD-10-CM

## 2020-06-29 RX ORDER — CITALOPRAM 40 MG/1
40 TABLET ORAL DAILY
Qty: 90 TABLET | Refills: 0 | Status: SHIPPED | OUTPATIENT
Start: 2020-06-29 | End: 2020-12-03 | Stop reason: SDUPTHER

## 2020-12-03 ENCOUNTER — OFFICE VISIT (OUTPATIENT)
Dept: FAMILY MEDICINE CLINIC | Facility: HOSPITAL | Age: 45
End: 2020-12-03
Payer: COMMERCIAL

## 2020-12-03 VITALS
TEMPERATURE: 97.5 F | HEIGHT: 66 IN | DIASTOLIC BLOOD PRESSURE: 76 MMHG | SYSTOLIC BLOOD PRESSURE: 112 MMHG | WEIGHT: 273 LBS | HEART RATE: 73 BPM | BODY MASS INDEX: 43.87 KG/M2

## 2020-12-03 DIAGNOSIS — Z13.1 SCREENING FOR DIABETES MELLITUS (DM): ICD-10-CM

## 2020-12-03 DIAGNOSIS — F41.1 GENERALIZED ANXIETY DISORDER: Primary | ICD-10-CM

## 2020-12-03 DIAGNOSIS — Z13.6 SCREENING FOR CARDIOVASCULAR CONDITION: ICD-10-CM

## 2020-12-03 DIAGNOSIS — E66.01 CLASS 3 SEVERE OBESITY DUE TO EXCESS CALORIES WITHOUT SERIOUS COMORBIDITY WITH BODY MASS INDEX (BMI) OF 40.0 TO 44.9 IN ADULT (HCC): ICD-10-CM

## 2020-12-03 PROCEDURE — 99213 OFFICE O/P EST LOW 20 MIN: CPT | Performed by: FAMILY MEDICINE

## 2020-12-03 RX ORDER — CITALOPRAM 40 MG/1
40 TABLET ORAL DAILY
Qty: 90 TABLET | Refills: 1 | Status: SHIPPED | OUTPATIENT
Start: 2020-12-03 | End: 2021-06-09 | Stop reason: SDUPTHER

## 2021-06-09 ENCOUNTER — OFFICE VISIT (OUTPATIENT)
Dept: FAMILY MEDICINE CLINIC | Facility: HOSPITAL | Age: 46
End: 2021-06-09

## 2021-06-09 VITALS
DIASTOLIC BLOOD PRESSURE: 86 MMHG | WEIGHT: 279.4 LBS | HEIGHT: 66 IN | SYSTOLIC BLOOD PRESSURE: 118 MMHG | HEART RATE: 70 BPM | BODY MASS INDEX: 44.9 KG/M2 | TEMPERATURE: 97.8 F

## 2021-06-09 DIAGNOSIS — E66.01 MORBID OBESITY (HCC): ICD-10-CM

## 2021-06-09 DIAGNOSIS — F41.1 GENERALIZED ANXIETY DISORDER: Primary | ICD-10-CM

## 2021-06-09 PROCEDURE — 99213 OFFICE O/P EST LOW 20 MIN: CPT | Performed by: FAMILY MEDICINE

## 2021-06-09 RX ORDER — CITALOPRAM 40 MG/1
40 TABLET ORAL DAILY
Qty: 90 TABLET | Refills: 1 | Status: SHIPPED | OUTPATIENT
Start: 2021-06-09 | End: 2022-02-03

## 2021-06-09 NOTE — PROGRESS NOTES
Assessment/Plan:      Problem List Items Addressed This Visit        Other    Generalized anxiety disorder - Primary    Relevant Medications    citalopram (CeleXA) 40 mg tablet    Morbid obesity (HCC)           Plan/Discussion:    Has ongoing stressors but doing relatively well with current regimen  No si/hi  conitnue with celexa  Discussed importance of getting labs as previously ordered  BMI Counseling: Body mass index is 45 79 kg/m²  The BMI is above normal  Nutrition recommendations include decreasing portion sizes, encouraging healthy choices of fruits and vegetables, decreasing fast food intake, moderation in carbohydrate intake and increasing intake of lean protein  Exercise recommendations include moderate physical activity 150 minutes/week  Subjective:   Chief Complaint   Patient presents with    Follow-up        Patient ID: Marsha Overall is a 54 y o  female  Pt seen for fu  Reports doing well with no new concerns  stressfull job and long hours  Has not been able to improve her diet or increase exercise  Reports she feels well on celexa and would like to continue the same regimen  No si/hi  The following portions of the patient's history were reviewed and updated as appropriate: allergies, current medications, past family history, past medical history, past social history, past surgical history and problem list     Review of Systems   Constitutional: Negative  Negative for activity change, appetite change, chills, diaphoresis, fatigue and fever  HENT: Negative for congestion, facial swelling and sore throat  Respiratory: Negative  Negative for apnea, cough, chest tightness and shortness of breath  Cardiovascular: Negative  Negative for chest pain and palpitations  Gastrointestinal: Negative  Negative for abdominal distention, abdominal pain, blood in stool, constipation, diarrhea and nausea  Genitourinary: Negative    Negative for difficulty urinating, dysuria, flank pain and frequency  Objective:  Vitals:    06/09/21 1135   BP: 118/86   Pulse: 70   Temp: 97 8 °F (36 6 °C)   Weight: 127 kg (279 lb 6 4 oz)   Height: 5' 5 5" (1 664 m)     BP Readings from Last 6 Encounters:   06/09/21 118/86   12/03/20 112/76   06/10/19 118/80   04/19/19 130/72   01/29/19 115/80   01/16/19 128/91      Wt Readings from Last 6 Encounters:   06/09/21 127 kg (279 lb 6 4 oz)   12/03/20 124 kg (273 lb)   06/10/19 120 kg (265 lb)   04/19/19 118 kg (260 lb)   01/29/19 119 kg (263 lb)   01/16/19 119 kg (261 lb 8 oz)             Physical Exam  Vitals signs and nursing note reviewed  Constitutional:       Appearance: She is well-developed  HENT:      Head: Normocephalic and atraumatic  Right Ear: External ear normal       Left Ear: External ear normal       Nose: Nose normal    Eyes:      Conjunctiva/sclera: Conjunctivae normal       Pupils: Pupils are equal, round, and reactive to light  Neck:      Musculoskeletal: Normal range of motion and neck supple  Thyroid: No thyromegaly  Trachea: No tracheal deviation  Cardiovascular:      Rate and Rhythm: Normal rate and regular rhythm  Heart sounds: Normal heart sounds  No murmur  Pulmonary:      Effort: Pulmonary effort is normal  No respiratory distress  Breath sounds: Normal breath sounds  No wheezing  Abdominal:      General: Bowel sounds are normal       Palpations: Abdomen is soft  Musculoskeletal: Normal range of motion  Skin:     General: Skin is warm and dry  Neurological:      Mental Status: She is oriented to person, place, and time     Psychiatric:         Mood and Affect: Mood normal          Behavior: Behavior normal

## 2022-02-03 DIAGNOSIS — F41.1 GENERALIZED ANXIETY DISORDER: ICD-10-CM

## 2022-02-03 RX ORDER — CITALOPRAM 40 MG/1
TABLET ORAL
Qty: 90 TABLET | Refills: 0 | Status: SHIPPED | OUTPATIENT
Start: 2022-02-03

## 2022-03-10 ENCOUNTER — OFFICE VISIT (OUTPATIENT)
Dept: FAMILY MEDICINE CLINIC | Facility: HOSPITAL | Age: 47
End: 2022-03-10
Payer: COMMERCIAL

## 2022-03-10 VITALS
HEART RATE: 74 BPM | WEIGHT: 277.6 LBS | TEMPERATURE: 98.5 F | BODY MASS INDEX: 44.61 KG/M2 | HEIGHT: 66 IN | SYSTOLIC BLOOD PRESSURE: 132 MMHG | DIASTOLIC BLOOD PRESSURE: 80 MMHG

## 2022-03-10 DIAGNOSIS — S83.241A OTHER TEAR OF MEDIAL MENISCUS, CURRENT INJURY, RIGHT KNEE, INITIAL ENCOUNTER: ICD-10-CM

## 2022-03-10 DIAGNOSIS — E66.01 MORBID OBESITY (HCC): ICD-10-CM

## 2022-03-10 DIAGNOSIS — F41.1 GENERALIZED ANXIETY DISORDER: Primary | ICD-10-CM

## 2022-03-10 DIAGNOSIS — Z13.6 SCREENING FOR CARDIOVASCULAR CONDITION: ICD-10-CM

## 2022-03-10 DIAGNOSIS — Z12.31 ENCOUNTER FOR SCREENING MAMMOGRAM FOR MALIGNANT NEOPLASM OF BREAST: ICD-10-CM

## 2022-03-10 DIAGNOSIS — M17.11 OSTEOARTHROSIS, LOCALIZED, PRIMARY, KNEE, RIGHT: ICD-10-CM

## 2022-03-10 PROBLEM — M25.461 EFFUSION OF RIGHT KNEE: Status: RESOLVED | Noted: 2019-01-16 | Resolved: 2022-03-10

## 2022-03-10 PROBLEM — M25.561 ACUTE PAIN OF RIGHT KNEE: Status: RESOLVED | Noted: 2018-09-12 | Resolved: 2022-03-10

## 2022-03-10 PROCEDURE — 3008F BODY MASS INDEX DOCD: CPT | Performed by: FAMILY MEDICINE

## 2022-03-10 PROCEDURE — 1036F TOBACCO NON-USER: CPT | Performed by: FAMILY MEDICINE

## 2022-03-10 PROCEDURE — 3725F SCREEN DEPRESSION PERFORMED: CPT | Performed by: FAMILY MEDICINE

## 2022-03-10 PROCEDURE — 99214 OFFICE O/P EST MOD 30 MIN: CPT | Performed by: FAMILY MEDICINE

## 2022-03-10 NOTE — PROGRESS NOTES
Assessment/Plan:      Problem List Items Addressed This Visit        Musculoskeletal and Integument    Osteoarthrosis, localized, primary, knee, right    Other tear of medial meniscus, current injury, right knee, initial encounter       Other    Generalized anxiety disorder - Primary    Relevant Orders    CBC    Comprehensive metabolic panel    TSH, 3rd generation with Free T4 reflex    Morbid obesity ([address] )      Other Visit Diagnoses     Encounter for screening mammogram for malignant neoplasm of breast        Relevant Orders    Mammo screening bilateral w 3d & cad    Screening for cardiovascular condition        Relevant Orders    Lipid Panel with Direct LDL reflex           Plan/Discussion:  Doing well  Continue with celexa  Need to get blood work  Ordered  No further refills until labs done  Cervical cancer screening due in 2023    Knee pain/meniscal tear  Will fu with Orthopedics  BMI Counseling: Body mass index is 45 49 kg/m²  The BMI is above normal  Nutrition recommendations include decreasing portion sizes, encouraging healthy choices of fruits and vegetables, decreasing fast food intake, moderation in carbohydrate intake and increasing intake of lean protein  Exercise recommendations include moderate physical activity 150 minutes/week  Rationale for BMI follow-up plan is due to patient being overweight or obese  Depression Screening and Follow-up Plan: Patient was screened for depression during today's encounter  They screened negative with a PHQ-2 score of 1  Subjective:   Chief Complaint   Patient presents with    Follow-up     Med check        Patient ID: Babita Glass is a 51 y o  female  Pt here for fu of betty  Doing well on celexa  Hs not obtained labs as previously advised  No new concerns  She will however make a fu apt with Ortho regarding her knee               The following portions of the patient's history were reviewed and updated as appropriate: allergies, current medications, past family history, past medical history, past social history, past surgical history and problem list     Review of Systems   Constitutional: Negative  Negative for activity change, appetite change, chills, diaphoresis, fatigue and fever  HENT: Negative for congestion, facial swelling and sore throat  Respiratory: Negative  Negative for apnea, cough, chest tightness and shortness of breath  Cardiovascular: Negative  Negative for chest pain and palpitations  Gastrointestinal: Negative  Negative for abdominal distention, abdominal pain, blood in stool, constipation, diarrhea and nausea  Genitourinary: Negative  Negative for difficulty urinating, dysuria, flank pain and frequency  Objective:  Vitals:    03/10/22 0859   BP: 132/80   Pulse: 74   Temp: 98 5 °F (36 9 °C)   Weight: 126 kg (277 lb 9 6 oz)   Height: 5' 5 5" (1 664 m)     BP Readings from Last 6 Encounters:   03/10/22 132/80   06/09/21 118/86   12/03/20 112/76   06/10/19 118/80   04/19/19 130/72   01/29/19 115/80      Wt Readings from Last 6 Encounters:   03/10/22 126 kg (277 lb 9 6 oz)   06/09/21 127 kg (279 lb 6 4 oz)   12/03/20 124 kg (273 lb)   06/10/19 120 kg (265 lb)   04/19/19 118 kg (260 lb)   01/29/19 119 kg (263 lb)             Physical Exam  Vitals and nursing note reviewed  Constitutional:       Appearance: Normal appearance  She is well-developed  HENT:      Head: Normocephalic and atraumatic  Right Ear: External ear normal       Left Ear: External ear normal       Nose: Nose normal    Eyes:      Conjunctiva/sclera: Conjunctivae normal       Pupils: Pupils are equal, round, and reactive to light  Neck:      Thyroid: No thyromegaly  Trachea: No tracheal deviation  Cardiovascular:      Rate and Rhythm: Normal rate and regular rhythm  Heart sounds: Normal heart sounds  No murmur heard  Pulmonary:      Effort: Pulmonary effort is normal  No respiratory distress  Breath sounds: Normal breath sounds  No wheezing  Abdominal:      General: Bowel sounds are normal       Palpations: Abdomen is soft  Musculoskeletal:         General: Normal range of motion  Cervical back: Normal range of motion and neck supple  Skin:     General: Skin is warm and dry  Neurological:      Mental Status: She is alert and oriented to person, place, and time     Psychiatric:         Mood and Affect: Mood normal          Behavior: Behavior normal

## 2022-03-31 ENCOUNTER — HOSPITAL ENCOUNTER (OUTPATIENT)
Dept: MAMMOGRAPHY | Facility: IMAGING CENTER | Age: 47
Discharge: HOME | End: 2022-03-31
Payer: COMMERCIAL

## 2022-03-31 VITALS — HEIGHT: 66 IN | BODY MASS INDEX: 43.39 KG/M2 | WEIGHT: 270 LBS

## 2022-03-31 DIAGNOSIS — Z12.31 ENCOUNTER FOR SCREENING MAMMOGRAM FOR MALIGNANT NEOPLASM OF BREAST: ICD-10-CM

## 2022-03-31 PROCEDURE — 77063 BREAST TOMOSYNTHESIS BI: CPT

## 2022-03-31 PROCEDURE — 77067 SCR MAMMO BI INCL CAD: CPT

## 2022-04-01 LAB
ALBUMIN SERPL-MCNC: 4.4 G/DL (ref 3.8–4.8)
ALBUMIN/GLOB SERPL: 1.7 {RATIO} (ref 1.2–2.2)
ALP SERPL-CCNC: 63 IU/L (ref 44–121)
ALT SERPL-CCNC: 9 IU/L (ref 0–32)
AST SERPL-CCNC: 11 IU/L (ref 0–40)
BILIRUB SERPL-MCNC: 0.5 MG/DL (ref 0–1.2)
BUN SERPL-MCNC: 12 MG/DL (ref 6–24)
BUN/CREAT SERPL: 13 (ref 9–23)
CALCIUM SERPL-MCNC: 9.8 MG/DL (ref 8.7–10.2)
CHLORIDE SERPL-SCNC: 101 MMOL/L (ref 96–106)
CHOLEST SERPL-MCNC: 235 MG/DL (ref 100–199)
CO2 SERPL-SCNC: 20 MMOL/L (ref 20–29)
CREAT SERPL-MCNC: 0.91 MG/DL (ref 0.57–1)
EGFR: 78 ML/MIN/1.73
ERYTHROCYTE [DISTWIDTH] IN BLOOD BY AUTOMATED COUNT: 13.2 % (ref 11.7–15.4)
GLOBULIN SER-MCNC: 2.6 G/DL (ref 1.5–4.5)
GLUCOSE SERPL-MCNC: 101 MG/DL (ref 65–99)
HCT VFR BLD AUTO: 35.9 % (ref 34–46.6)
HDLC SERPL-MCNC: 49 MG/DL
HGB BLD-MCNC: 11.8 G/DL (ref 11.1–15.9)
LDLC SERPL CALC-MCNC: 158 MG/DL (ref 0–99)
LDLC/HDLC SERPL: 3.2 RATIO (ref 0–3.2)
MCH RBC QN AUTO: 28.4 PG (ref 26.6–33)
MCHC RBC AUTO-ENTMCNC: 32.9 G/DL (ref 31.5–35.7)
MCV RBC AUTO: 87 FL (ref 79–97)
PLATELET # BLD AUTO: 307 X10E3/UL (ref 150–450)
POTASSIUM SERPL-SCNC: 4.8 MMOL/L (ref 3.5–5.2)
PROT SERPL-MCNC: 7 G/DL (ref 6–8.5)
RBC # BLD AUTO: 4.15 X10E6/UL (ref 3.77–5.28)
SL AMB VLDL CHOLESTEROL CALC: 28 MG/DL (ref 5–40)
SODIUM SERPL-SCNC: 139 MMOL/L (ref 134–144)
TRIGL SERPL-MCNC: 154 MG/DL (ref 0–149)
TSH SERPL DL<=0.005 MIU/L-ACNC: 1.69 UIU/ML (ref 0.45–4.5)
WBC # BLD AUTO: 6.4 X10E3/UL (ref 3.4–10.8)

## 2022-04-05 ENCOUNTER — TELEPHONE (OUTPATIENT)
Dept: FAMILY MEDICINE CLINIC | Facility: HOSPITAL | Age: 47
End: 2022-04-05

## 2022-04-22 ENCOUNTER — OFFICE VISIT (OUTPATIENT)
Dept: OBGYN CLINIC | Facility: CLINIC | Age: 47
End: 2022-04-22
Payer: COMMERCIAL

## 2022-04-22 VITALS
HEIGHT: 66 IN | BODY MASS INDEX: 43.71 KG/M2 | DIASTOLIC BLOOD PRESSURE: 80 MMHG | SYSTOLIC BLOOD PRESSURE: 128 MMHG | WEIGHT: 272 LBS

## 2022-04-22 DIAGNOSIS — S83.241A OTHER TEAR OF MEDIAL MENISCUS, CURRENT INJURY, RIGHT KNEE, INITIAL ENCOUNTER: ICD-10-CM

## 2022-04-22 DIAGNOSIS — S83.281A TEAR OF LATERAL MENISCUS OF RIGHT KNEE, CURRENT, UNSPECIFIED TEAR TYPE, INITIAL ENCOUNTER: Primary | ICD-10-CM

## 2022-04-22 PROCEDURE — 99204 OFFICE O/P NEW MOD 45 MIN: CPT | Performed by: ORTHOPAEDIC SURGERY

## 2022-04-22 PROCEDURE — 3008F BODY MASS INDEX DOCD: CPT | Performed by: ORTHOPAEDIC SURGERY

## 2022-04-22 PROCEDURE — 1036F TOBACCO NON-USER: CPT | Performed by: ORTHOPAEDIC SURGERY

## 2022-04-22 RX ORDER — CHLORHEXIDINE GLUCONATE 0.12 MG/ML
15 RINSE ORAL ONCE
Status: CANCELLED | OUTPATIENT
Start: 2022-04-22 | End: 2022-04-22

## 2022-04-22 NOTE — H&P (VIEW-ONLY)
Ortho Sports Medicine Knee New Patient Visit     Assesment:   51 y o  female right knee medial and lateral meniscus tears    Plan:      Conservative treatment:    Ice to knee for 20 minutes at least 1-2 times daily  Post-op PT for ROM/strengthening to knee, hip and core  OTC NSAIDS prn for pain  Crutches order, and Jenn was advised to bring with her on the scheduled surgery date  Follow-up 1 week post-op  Imaging: All imaging from today was reviewed by myself and explained to the patient  Injection:    No Injection planned at this time  Jenn declined an injection at this visit and would prefer to proceed with a surgical arthroscopy  Surgery: All of the risks and benefits of operative treatment were explained to the patient, as well as the risks and benefits of any alternative treatment options, including nonoperative care  The risks of surgical treatement include, but are not limited to, infection, bleeding, blood clot, neurovascular damage, need for further surgery, continued pain, cardiovascular risk, and anesthesia risk  The patient understood this and elects to proceed forward with surgical intervention  We will proceed forward with surgical arthroscopy of the knee with medial and lateral meniscectomies  Follow up:    Return for 1 week post-op  Chief Complaint   Patient presents with    Right Knee - Pain       History of Present Illness: The patient is a 51 y o  female 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, referred to me by themself, seen in clinic for consultation of right knee pain  Pain is located anterior, lateral, medial, deep  The patient rates the pain as a 7-8/10  The pain has been present for 3 years  The patient does not recall sustaining a specific injury  The pain is characterized as sharp, stabbing  The pain is present at all times  She also reports radiating pain form her hip to her lateral lower leg, as well as swelling below the medial joint line  Pain is improved by rest and NSAIDS  Pain is aggravated by stairs, squatting, weight bearing, bending down, and walking  Symptoms include clicking, popping, swelling and locking  The patient has tried rest, ice, NSAIDS and injection, last received in 2019 with no relief  Knee Surgical History:  None    Past Medical, Social and Family History:  Past Medical History:   Diagnosis Date    Acute pain of right knee 2018    Dental disorder     last assessed: Dec 4, 2014    Effusion of right knee 2019    Generalized anxiety disorder 2018     Past Surgical History:   Procedure Laterality Date     SECTION      x2     No Known Allergies  Current Outpatient Medications on File Prior to Visit   Medication Sig Dispense Refill    citalopram (CeleXA) 40 mg tablet Take 1 tablet by mouth once daily 90 tablet 0    diclofenac sodium (VOLTAREN) 1 % Apply 2 g topically 4 (four) times a day (Patient not taking: Reported on 12/3/2020) 1 Tube 0     No current facility-administered medications on file prior to visit       Social History     Socioeconomic History    Marital status: Single     Spouse name: Not on file    Number of children: Not on file    Years of education: Not on file    Highest education level: Not on file   Occupational History    Not on file   Tobacco Use    Smoking status: Never Smoker    Smokeless tobacco: Never Used   Substance and Sexual Activity    Alcohol use: Yes     Comment: Remote social alcohol use     Drug use: No    Sexual activity: Not on file   Other Topics Concern    Not on file   Social History Narrative    Not on file     Social Determinants of Health     Financial Resource Strain: Not on file   Food Insecurity: Not on file   Transportation Needs: Not on file   Physical Activity: Not on file   Stress: Not on file   Social Connections: Not on file   Intimate Partner Violence: Not on file   Housing Stability: Not on file         I have reviewed the past medical, surgical, social and family history, medications and allergies as documented in the EMR  Review of systems: ROS is negative other than that noted in the HPI  Constitutional: Negative for fatigue and fever  HENT: Negative for sore throat  Respiratory: Negative for shortness of breath  Cardiovascular: Negative for chest pain  Gastrointestinal: Negative for abdominal pain  Endocrine: Negative for cold intolerance and heat intolerance  Genitourinary: Negative for flank pain  Musculoskeletal: Negative for back pain  Skin: Negative for rash  Allergic/Immunologic: Negative for immunocompromised state  Neurological: Negative for dizziness  Psychiatric/Behavioral: Negative for agitation  Physical Exam:    Blood pressure 128/80, height 5' 5 5" (1 664 m), weight 123 kg (272 lb)  General/Constitutional: NAD, well developed, well nourished  HENT: Normocephalic, atraumatic  CV: Intact distal pulses, regular rate  Resp: No respiratory distress or labored breathing  Lymphatic: No lymphadenopathy palpated  Neuro: Alert and Oriented x 3, no focal deficits  Psych: Normal mood, normal affect, normal judgement, normal behavior  Skin: Warm, dry, no rashes, no erythema      Knee Exam (focused): RIGHT LEFT   ROM:   0-130 0-130   Palpation: Effusion mild negative     MJL tenderness Positive Negative     LJL tenderness Positive Negative   Meniscus:  Jose Negative Negative    Apley's Compression Negative Negative   Instability: Varus stable stable     Valgus stable stable   Special Tests: Lachman Negative Negative     Posterior drawer Negative Negative     Anterior drawer Negative Negative     Pivot shift not tested not tested     Dial not tested not tested   Patella: Palpation medial facet ttp and lateral facet ttp no tenderness     Mobility 1/4 1/4     Apprehension Negative Negative   Other: Single leg 1/4 squat not tested not tested      TTP distal right IT band    LE NV Exam: +2 DP/PT pulses bilaterally  Sensation intact to light touch L2-S1 bilaterally     Bilateral hip ROM demonstrates no pain actively or passively    No calf tenderness to palpation bilaterally    Knee Imaging    X-rays of the right knee were reviewed, which demonstrate no acute osseous deformity  I have reviewed the radiology report and agree with their impression  MRI of the right knee were reviewed, which demonstrate a small horizontal medial meniscus tear between the body and the posterior horn, and a small horizontal articular surface lateral meniscus tear between the body and anterior horn  I have reviewed the radiology report and agree with their impression        Scribe Attestation    I,:  Estella Vincent am acting as a scribe while in the presence of the attending physician :       I,:  Howard Felix, DO personally performed the services described in this documentation    as scribed in my presence :

## 2022-04-22 NOTE — PROGRESS NOTES
Ortho Sports Medicine Knee New Patient Visit     Assesment:   51 y o  female right knee medial and lateral meniscus tears    Plan:      Conservative treatment:    Ice to knee for 20 minutes at least 1-2 times daily  Post-op PT for ROM/strengthening to knee, hip and core  OTC NSAIDS prn for pain  Crutches order, and Jenn was advised to bring with her on the scheduled surgery date  Follow-up 1 week post-op  Imaging: All imaging from today was reviewed by myself and explained to the patient  Injection:    No Injection planned at this time  Jenn declined an injection at this visit and would prefer to proceed with a surgical arthroscopy  Surgery: All of the risks and benefits of operative treatment were explained to the patient, as well as the risks and benefits of any alternative treatment options, including nonoperative care  The risks of surgical treatement include, but are not limited to, infection, bleeding, blood clot, neurovascular damage, need for further surgery, continued pain, cardiovascular risk, and anesthesia risk  The patient understood this and elects to proceed forward with surgical intervention  We will proceed forward with surgical arthroscopy of the knee with medial and lateral meniscectomies  Follow up:    Return for 1 week post-op  Chief Complaint   Patient presents with    Right Knee - Pain       History of Present Illness: The patient is a 51 y o  female 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, referred to me by themself, seen in clinic for consultation of right knee pain  Pain is located anterior, lateral, medial, deep  The patient rates the pain as a 7-8/10  The pain has been present for 3 years  The patient does not recall sustaining a specific injury  The pain is characterized as sharp, stabbing  The pain is present at all times  She also reports radiating pain form her hip to her lateral lower leg, as well as swelling below the medial joint line  Pain is improved by rest and NSAIDS  Pain is aggravated by stairs, squatting, weight bearing, bending down, and walking  Symptoms include clicking, popping, swelling and locking  The patient has tried rest, ice, NSAIDS and injection, last received in 2019 with no relief  Knee Surgical History:  None    Past Medical, Social and Family History:  Past Medical History:   Diagnosis Date    Acute pain of right knee 2018    Dental disorder     last assessed: Dec 4, 2014    Effusion of right knee 2019    Generalized anxiety disorder 2018     Past Surgical History:   Procedure Laterality Date     SECTION      x2     No Known Allergies  Current Outpatient Medications on File Prior to Visit   Medication Sig Dispense Refill    citalopram (CeleXA) 40 mg tablet Take 1 tablet by mouth once daily 90 tablet 0    diclofenac sodium (VOLTAREN) 1 % Apply 2 g topically 4 (four) times a day (Patient not taking: Reported on 12/3/2020) 1 Tube 0     No current facility-administered medications on file prior to visit       Social History     Socioeconomic History    Marital status: Single     Spouse name: Not on file    Number of children: Not on file    Years of education: Not on file    Highest education level: Not on file   Occupational History    Not on file   Tobacco Use    Smoking status: Never Smoker    Smokeless tobacco: Never Used   Substance and Sexual Activity    Alcohol use: Yes     Comment: Remote social alcohol use     Drug use: No    Sexual activity: Not on file   Other Topics Concern    Not on file   Social History Narrative    Not on file     Social Determinants of Health     Financial Resource Strain: Not on file   Food Insecurity: Not on file   Transportation Needs: Not on file   Physical Activity: Not on file   Stress: Not on file   Social Connections: Not on file   Intimate Partner Violence: Not on file   Housing Stability: Not on file         I have reviewed the past medical, surgical, social and family history, medications and allergies as documented in the EMR  Review of systems: ROS is negative other than that noted in the HPI  Constitutional: Negative for fatigue and fever  HENT: Negative for sore throat  Respiratory: Negative for shortness of breath  Cardiovascular: Negative for chest pain  Gastrointestinal: Negative for abdominal pain  Endocrine: Negative for cold intolerance and heat intolerance  Genitourinary: Negative for flank pain  Musculoskeletal: Negative for back pain  Skin: Negative for rash  Allergic/Immunologic: Negative for immunocompromised state  Neurological: Negative for dizziness  Psychiatric/Behavioral: Negative for agitation  Physical Exam:    Blood pressure 128/80, height 5' 5 5" (1 664 m), weight 123 kg (272 lb)  General/Constitutional: NAD, well developed, well nourished  HENT: Normocephalic, atraumatic  CV: Intact distal pulses, regular rate  Resp: No respiratory distress or labored breathing  Lymphatic: No lymphadenopathy palpated  Neuro: Alert and Oriented x 3, no focal deficits  Psych: Normal mood, normal affect, normal judgement, normal behavior  Skin: Warm, dry, no rashes, no erythema      Knee Exam (focused): RIGHT LEFT   ROM:   0-130 0-130   Palpation: Effusion mild negative     MJL tenderness Positive Negative     LJL tenderness Positive Negative   Meniscus:  Jose Negative Negative    Apley's Compression Negative Negative   Instability: Varus stable stable     Valgus stable stable   Special Tests: Lachman Negative Negative     Posterior drawer Negative Negative     Anterior drawer Negative Negative     Pivot shift not tested not tested     Dial not tested not tested   Patella: Palpation medial facet ttp and lateral facet ttp no tenderness     Mobility 1/4 1/4     Apprehension Negative Negative   Other: Single leg 1/4 squat not tested not tested      TTP distal right IT band    LE NV Exam: +2 DP/PT pulses bilaterally  Sensation intact to light touch L2-S1 bilaterally     Bilateral hip ROM demonstrates no pain actively or passively    No calf tenderness to palpation bilaterally    Knee Imaging    X-rays of the right knee were reviewed, which demonstrate no acute osseous deformity  I have reviewed the radiology report and agree with their impression  MRI of the right knee were reviewed, which demonstrate a small horizontal medial meniscus tear between the body and the posterior horn, and a small horizontal articular surface lateral meniscus tear between the body and anterior horn  I have reviewed the radiology report and agree with their impression        Scribe Attestation    I,:  Kapil Fernandez am acting as a scribe while in the presence of the attending physician :       I,:  Lisa Burton, DO personally performed the services described in this documentation    as scribed in my presence :

## 2022-04-28 ENCOUNTER — HOSPITAL ENCOUNTER (OUTPATIENT)
Dept: ULTRASOUND IMAGING | Facility: CLINIC | Age: 47
Discharge: HOME | End: 2022-04-28
Payer: COMMERCIAL

## 2022-04-28 ENCOUNTER — HOSPITAL ENCOUNTER (OUTPATIENT)
Dept: MAMMOGRAPHY | Facility: CLINIC | Age: 47
Discharge: HOME | End: 2022-04-28
Payer: COMMERCIAL

## 2022-04-28 VITALS — HEIGHT: 66 IN | WEIGHT: 271.17 LBS | BODY MASS INDEX: 43.58 KG/M2

## 2022-04-28 VITALS — WEIGHT: 272 LBS | BODY MASS INDEX: 43.71 KG/M2 | HEIGHT: 66 IN

## 2022-04-28 DIAGNOSIS — R92.8 ABNORMAL SCREENING MAMMOGRAM: ICD-10-CM

## 2022-04-28 PROCEDURE — 77065 DX MAMMO INCL CAD UNI: CPT

## 2022-04-28 PROCEDURE — G0279 TOMOSYNTHESIS, MAMMO: HCPCS

## 2022-04-28 PROCEDURE — 76642 ULTRASOUND BREAST LIMITED: CPT

## 2022-05-11 ENCOUNTER — ANESTHESIA EVENT (OUTPATIENT)
Dept: PERIOP | Facility: HOSPITAL | Age: 47
End: 2022-05-11
Payer: COMMERCIAL

## 2022-05-17 NOTE — PRE-PROCEDURE INSTRUCTIONS
Pre-Surgery Instructions:   Medication Instructions    citalopram (CeleXA) 40 mg tablet Take day of surgery  with sips water    Pre op instructions per My Surgical Experience booklet,medications per anesthesia guidelines and showering instructions per [facility] Serrano protocol reviewed-Patient has CHG  Instructed to bring Crutches DOS  Pt  Verbalized understanding of current visitor restrictions  Aware she must wear a mask DOS  Instructed to call surgeon with any illness or covid exposure now till DOS  Instructed to avoid all ASA/NSAIDs and OTC Vit/Supp from now until after surgery per anesthesia guidelines  Tylenol ok prn  Pt  Verbalized an understanding of all instructions reviewed and offers no concerns at this time

## 2022-05-18 ENCOUNTER — ANESTHESIA (OUTPATIENT)
Dept: PERIOP | Facility: HOSPITAL | Age: 47
End: 2022-05-18
Payer: COMMERCIAL

## 2022-05-18 ENCOUNTER — HOSPITAL ENCOUNTER (OUTPATIENT)
Facility: HOSPITAL | Age: 47
Setting detail: OUTPATIENT SURGERY
Discharge: HOME | End: 2022-05-18
Attending: ORTHOPAEDIC SURGERY | Admitting: ORTHOPAEDIC SURGERY
Payer: COMMERCIAL

## 2022-05-18 VITALS
WEIGHT: 276.4 LBS | SYSTOLIC BLOOD PRESSURE: 146 MMHG | RESPIRATION RATE: 14 BRPM | OXYGEN SATURATION: 97 % | DIASTOLIC BLOOD PRESSURE: 78 MMHG | HEART RATE: 72 BPM | TEMPERATURE: 98 F | BODY MASS INDEX: 46.05 KG/M2 | HEIGHT: 65 IN

## 2022-05-18 DIAGNOSIS — Z98.890 S/P MENISCECTOMY: Primary | ICD-10-CM

## 2022-05-18 LAB
EXT PREGNANCY TEST URINE: NEGATIVE
EXT. CONTROL: NORMAL

## 2022-05-18 PROCEDURE — 81025 URINE PREGNANCY TEST: CPT | Performed by: ORTHOPAEDIC SURGERY

## 2022-05-18 PROCEDURE — 29880 ARTHRS KNE SRG MNISECTMY M&L: CPT | Performed by: ORTHOPAEDIC SURGERY

## 2022-05-18 RX ORDER — ONDANSETRON 2 MG/ML
4 INJECTION INTRAMUSCULAR; INTRAVENOUS ONCE
Status: DISCONTINUED | OUTPATIENT
Start: 2022-05-18 | End: 2022-05-18 | Stop reason: HOSPADM

## 2022-05-18 RX ORDER — KETOROLAC TROMETHAMINE 30 MG/ML
INJECTION, SOLUTION INTRAMUSCULAR; INTRAVENOUS AS NEEDED
Status: DISCONTINUED | OUTPATIENT
Start: 2022-05-18 | End: 2022-05-18

## 2022-05-18 RX ORDER — FENTANYL CITRATE 50 UG/ML
INJECTION, SOLUTION INTRAMUSCULAR; INTRAVENOUS AS NEEDED
Status: DISCONTINUED | OUTPATIENT
Start: 2022-05-18 | End: 2022-05-18

## 2022-05-18 RX ORDER — DEXAMETHASONE SODIUM PHOSPHATE 10 MG/ML
INJECTION, SOLUTION INTRAMUSCULAR; INTRAVENOUS AS NEEDED
Status: DISCONTINUED | OUTPATIENT
Start: 2022-05-18 | End: 2022-05-18

## 2022-05-18 RX ORDER — CEFAZOLIN SODIUM 2 G/50ML
2000 SOLUTION INTRAVENOUS ONCE
Status: COMPLETED | OUTPATIENT
Start: 2022-05-18 | End: 2022-05-18

## 2022-05-18 RX ORDER — ONDANSETRON 2 MG/ML
INJECTION INTRAMUSCULAR; INTRAVENOUS AS NEEDED
Status: DISCONTINUED | OUTPATIENT
Start: 2022-05-18 | End: 2022-05-18

## 2022-05-18 RX ORDER — CHLORHEXIDINE GLUCONATE 0.12 MG/ML
15 RINSE ORAL ONCE
Status: DISCONTINUED | OUTPATIENT
Start: 2022-05-18 | End: 2022-05-18 | Stop reason: HOSPADM

## 2022-05-18 RX ORDER — SODIUM CHLORIDE, SODIUM LACTATE, POTASSIUM CHLORIDE, CALCIUM CHLORIDE 600; 310; 30; 20 MG/100ML; MG/100ML; MG/100ML; MG/100ML
INJECTION, SOLUTION INTRAVENOUS CONTINUOUS PRN
Status: DISCONTINUED | OUTPATIENT
Start: 2022-05-18 | End: 2022-05-18

## 2022-05-18 RX ORDER — FENTANYL CITRATE/PF 50 MCG/ML
25 SYRINGE (ML) INJECTION
Status: DISCONTINUED | OUTPATIENT
Start: 2022-05-18 | End: 2022-05-18 | Stop reason: HOSPADM

## 2022-05-18 RX ORDER — MIDAZOLAM HYDROCHLORIDE 2 MG/2ML
INJECTION, SOLUTION INTRAMUSCULAR; INTRAVENOUS AS NEEDED
Status: DISCONTINUED | OUTPATIENT
Start: 2022-05-18 | End: 2022-05-18

## 2022-05-18 RX ORDER — ASPIRIN 325 MG
325 TABLET, DELAYED RELEASE (ENTERIC COATED) ORAL DAILY
Qty: 30 TABLET | Refills: 0 | Status: SHIPPED | OUTPATIENT
Start: 2022-05-18 | End: 2022-05-27 | Stop reason: ALTCHOICE

## 2022-05-18 RX ORDER — CEFAZOLIN SODIUM 2 G/50ML
2000 SOLUTION INTRAVENOUS ONCE
Status: DISCONTINUED | OUTPATIENT
Start: 2022-05-18 | End: 2022-05-18

## 2022-05-18 RX ORDER — PROPOFOL 10 MG/ML
INJECTION, EMULSION INTRAVENOUS AS NEEDED
Status: DISCONTINUED | OUTPATIENT
Start: 2022-05-18 | End: 2022-05-18

## 2022-05-18 RX ORDER — OXYCODONE HYDROCHLORIDE 5 MG/1
5 TABLET ORAL EVERY 4 HOURS PRN
Qty: 15 TABLET | Refills: 0 | Status: SHIPPED | OUTPATIENT
Start: 2022-05-18 | End: 2022-05-27 | Stop reason: ALTCHOICE

## 2022-05-18 RX ORDER — CEFAZOLIN SODIUM 1 G/50ML
1000 SOLUTION INTRAVENOUS ONCE
Status: COMPLETED | OUTPATIENT
Start: 2022-05-18 | End: 2022-05-18

## 2022-05-18 RX ORDER — HYDROMORPHONE HCL/PF 1 MG/ML
0.5 SYRINGE (ML) INJECTION
Status: DISCONTINUED | OUTPATIENT
Start: 2022-05-18 | End: 2022-05-18 | Stop reason: HOSPADM

## 2022-05-18 RX ADMIN — FENTANYL CITRATE 25 MCG: 50 INJECTION, SOLUTION INTRAMUSCULAR; INTRAVENOUS at 09:59

## 2022-05-18 RX ADMIN — SODIUM CHLORIDE, SODIUM LACTATE, POTASSIUM CHLORIDE, AND CALCIUM CHLORIDE: .6; .31; .03; .02 INJECTION, SOLUTION INTRAVENOUS at 09:48

## 2022-05-18 RX ADMIN — FENTANYL CITRATE 25 MCG: 50 INJECTION, SOLUTION INTRAMUSCULAR; INTRAVENOUS at 10:47

## 2022-05-18 RX ADMIN — FENTANYL CITRATE 25 MCG: 50 INJECTION, SOLUTION INTRAMUSCULAR; INTRAVENOUS at 10:48

## 2022-05-18 RX ADMIN — PROPOFOL 200 MG: 10 INJECTION, EMULSION INTRAVENOUS at 09:53

## 2022-05-18 RX ADMIN — FENTANYL CITRATE 25 MCG: 50 INJECTION, SOLUTION INTRAMUSCULAR; INTRAVENOUS at 10:09

## 2022-05-18 RX ADMIN — CEFAZOLIN SODIUM 2000 MG: 2 SOLUTION INTRAVENOUS at 09:47

## 2022-05-18 RX ADMIN — CEFAZOLIN SODIUM 1000 MG: 1 SOLUTION INTRAVENOUS at 09:47

## 2022-05-18 RX ADMIN — DEXAMETHASONE SODIUM PHOSPHATE 10 MG: 10 INJECTION, SOLUTION INTRAMUSCULAR; INTRAVENOUS at 09:55

## 2022-05-18 RX ADMIN — MIDAZOLAM 2 MG: 1 INJECTION INTRAMUSCULAR; INTRAVENOUS at 09:45

## 2022-05-18 RX ADMIN — ONDANSETRON 4 MG: 2 INJECTION INTRAMUSCULAR; INTRAVENOUS at 09:55

## 2022-05-18 RX ADMIN — KETOROLAC TROMETHAMINE 15 MG: 30 INJECTION, SOLUTION INTRAMUSCULAR; INTRAVENOUS at 10:29

## 2022-05-18 NOTE — ANESTHESIA PREPROCEDURE EVALUATION
Procedure:  MENISCECTOMY LATERAL /MEDIAL-arthroscopy (Right Knee)    Relevant Problems   MUSCULOSKELETAL   (+) Osteoarthrosis, localized, primary, knee, right      NEURO/PSYCH   (+) Anxiety   (+) Generalized anxiety disorder      Other   (+) Morbid obesity (HCC)        Physical Exam    Airway    Mallampati score: II  TM Distance: >3 FB       Dental   upper dentures and lower dentures,     Cardiovascular  Cardiovascular exam normal    Pulmonary  Pulmonary exam normal     Other Findings        Anesthesia Plan  ASA Score- 2     Anesthesia Type- general with ASA Monitors  Additional Monitors:   Airway Plan: LMA  Plan Factors-    Chart reviewed  Existing labs reviewed  Patient summary reviewed  Patient is not a current smoker  Induction- intravenous  Postoperative Plan- Plan for postoperative opioid use  Informed Consent- Anesthetic plan and risks discussed with patient  I personally reviewed this patient with the CRNA  Discussed and agreed on the Anesthesia Plan with the CRNA  Rashida Plunkett

## 2022-05-18 NOTE — ANESTHESIA POSTPROCEDURE EVALUATION
Post-Op Assessment Note    CV Status:  Stable  Pain Score: 0    Pain management: adequate     Mental Status:  Alert and awake   Hydration Status:  Euvolemic   PONV Controlled:  Controlled   Airway Patency:  Patent      Post Op Vitals Reviewed: Yes      Staff: CRNA         No complications documented      BP   170/90   Temp   98 3   Pulse  78   Resp   16   SpO2   95

## 2022-05-18 NOTE — OP NOTE
OPERATIVE REPORT  PATIENT NAME: Roni Mc    :  1975  MRN: [id number]  Pt Location: [facility] OR ROOM 02    SURGERY DATE: 2022    Surgeon(s) and Role:     * Gregory Dawson DO - Primary     * Leora Santiago - Assisting     * Arcelia Anaya MD - Assisting    Preop Diagnosis:  Other tear of medial meniscus, current injury, right knee, initial encounter [S83 241A]  Tear of lateral meniscus of right knee, current, unspecified tear type, initial encounter [S83 281A]    Post-Op Diagnosis Codes:     * Other tear of medial meniscus, current injury, right knee, initial encounter [S83 241A]     * Tear of lateral meniscus of right knee, current, unspecified tear type, initial encounter [S83 281A]    Procedure(s) (LRB):  MENISCECTOMY LATERAL /MEDIAL-arthroscopy (Right)    Specimen(s):  * No specimens in log *    Estimated Blood Loss:   Minimal    Drains:  * No LDAs found *    Anesthesia Type:   General    Operative Indications: Other tear of medial meniscus, current injury, right knee, initial encounter [S83 241A]  Tear of lateral meniscus of right knee, current, unspecified tear type, initial encounter [Q00 156B]    Complications:   None    Procedure and Technique:      Pre-operative Diagnosis: Right knee lateral meniscus tear           Right knee medial meniscus tear    Post-operative Diagnosis: Right knee lateral meniscus tear          Right knee medial meniscus tear     Operation:  Surgical arthroscopy of the Right knee with partial medial AND lateral meniscectomy     Operative Modifiers:  None      Tourniquet Time: 32 min     Blood Loss:  Minimal      Indications: Ms Adi Aldana is a 51 y o  female with a medial and lateral meniscus tear   An MRI was obtained a revealed a tear of the Right medial and lateral lateral meniscus   Due to the patient's MRI findings, active lifestyle, and lack of improvement with a conservative approach, it was recommended that they proceed forward with arthroscopic surgical management of this problem  We reviewed risks and benefits of surgery and a decision was made to proceed with surgery to address the torn medial and lateral lateral meniscus             Findings:       Examination under anesthesia of the operative Right knee revealed a range of motion of 0-130 degrees  Posterior drawer testing was negative  Lachman testing was negative  Pivot shift testing was negative,  Collateral ligament stability testing revealed no laxity with valgus or varus stresses  With respect to posterolateral corner testing, dial testing at 30 and at 90 degrees was symmetric to the contralateral knee  Arthroscopic evaluation of the knee revealed the following:     Medial meniscus: There was a complex, multidirectional tear of the medial meniscus  Medial femoral condyle:Grade II chondral defects  Medial tibial plateau: Grade  III chondral defects  Anterior cruciate ligament: Normal appearance  Posterior cruciate ligament: Normal appearance  Lateral meniscus: There was a complex, multidirectional tear of the lateral meniscus     Lateral femoral condyle: Grade I chondral defects  Lateral tibial plateau: GradeII chondral defects     Medial and lateral gutters: No loose bodies  Patella: Grade I chondral defects  Trochlea: Grade I chondral defects  Medial plica: No significant plica was present             Procedure:  In the pre-operative holding area, the patient identified the correct operative extremity and I marked that extremity with my initials, using a permanent marker  The patient was brought to the operating room and positioned supine  Following satisfactory induction of anesthesia, the Right knee was prepped and draped in the usual sterile fashion for surgical arthroscopy of the Right knee   Before any surgical instrumentation was passed to me by the surgical technician, a formalized time-out occurred, which involves the surgeon, circulating nurse, and anesthesia staff all verifying the correct operative extremity  My initials were visible on the prepped and draped operative field  The anatomic landmarks of the anteromedial and anterolateral portals were marked  The anterolateral portal was established with a scalpel  The arthroscope was introduced through this portal  Under direct visualization, the anteromedial portal was established with a localizing needle followed by a scalpel  A probe was then introduced into the anteromedial portal  A systematic diagnostic arthroscopy evaluated the following:  medial compartment, notch, lateral compartment, patellofemoral compartment, medial gutter, and lateral gutter  A complex lateral meniscus tear was noted  This tear was associated with meniscal fragments that were grossly unstable to probing  The lateral meniscus tear was debrided to a stable base, using the arthroscopic biters and motorized shaver      A complex medial meniscus tear was noted  This tear was associated with meniscal fragments that were grossly unstable to probing  The medial meniscus tear was debrided to a stable base, using the arthroscopic biters and motorized shaver     There was no additional pathology  All particulate debris was removed  The knee was copiously rinsed and then drained  The portals were closed with an interrupted 4-0 monocryl absorbable suture  The skin was cleansed with sterile saline and dried before Steri-Strips were applied  Finally, a sterile dressing was secured by Webril and an Ace wrap         I was present for the entire procedure      Patient Disposition:  PACU       SIGNATURE: Portia Yoa DO  DATE: May 18, 2022  TIME: 10:51 AM

## 2022-05-18 NOTE — DISCHARGE INSTRUCTIONS
POSTOPERATIVE INSTRUCTIONS following KNEE SURGERY    MEDICATIONS:  Resume all home medications unless otherwise instructed by your surgeon  Pain Medication:  Oxycodone 5 mg, 1 tablet every 4 hours as needed  If you were given a regional anesthetic (nerve block), please begin taking the pain medication as soon as you get home, even if you have minimal or no pain  DO NOT WAIT FOR THE NERVE BLOCK TO WEAR OFF  Possible side effects include nausea, constipation, and urinary retention  If you experience these side effects, please call our office for assistance  Pain med refills are authorized only during office hours (8am-4pm, Mon-Fri)  Anti-Inflammatory:  Ibuprofen 600 mg, 1 tablet every 8 hours for 4 weeks and Tylenol 325 mg, 1-2 tablets every 6 hours for 4 weeks  Take with food  Stop if you experience nausea, reflux, or stomach pain  Nausea Medication:  None  Fill prescription ONLY if you expericnce severe nausea  Blood Clot Prevention:  Aspirin 325 mg, 1 tablet daily for 3 weeks  Pump your foot up and down 20 times per hour while you are less mobile  WOUND CARE:  Keep the dressing clean and dry  Light drainage may occur the first 2 days postop  You may remove the dressings and get the incision wet in the shower 72 hours after surgery  DO NOT remove steri-strips or sutures  DO NOT immerse the incision under water  Carefully pat the incision dry  If there is wound drainage, re-apply a fresh dry gauze dressing  Please call our office ([phone]) if you experience either of the following:  Sudden increase in swelling, redness, or warmth at the surgical site  Excessive incisional drainage that persists beyond the 3rd day after surgery  Oral temperature greater than 101 degrees, not relieved with Tylenol  Shortness of breath, chest pain, nausea, or any other concerning symptoms    SWELLING CONTROL:  Cold Therapy:   The cold therapy device may be used either continuously or only as needed, according to your preference  Do not let the pad directly touch your skin  Alternatively, apply ice (20 min on, 20 min off) as often as you feel is necessary  Elevation:  Elevate the entire leg above heart level  Place pillows under your ankle to keep your knee straight  Compression:  Apply ACE wraps or a thigh-length compression stocking as needed  RANGE OF MOTION:  You are allowed FULL RANGE OF MOTION as tolerated  IMMOBILIZATION:  None  You are allowed full range of motion as tolerated  ACTIVITY:   BEAR FULL WEIGHT AS TOLERATED on the operative leg  Use crutches to assist only as needed  Using Crutches on Stairs:  Going up, lead with your "good" (nonoperative) leg  Going down, lead with your "bad" (operative) leg  Use a hand rail when available  Knee Extension:  Place a rolled towel or pillow under your ankle for 20-30 minutes 3-5 times per day  This will help to maintain full knee extension  Quad Sets:  Sit or lie with your knee straight  Tighten your quadriceps (front thigh) muscle  Hold for 3 seconds, then relax  Repeat 20 times per hour while awake  PHYSICAL THERAPY:  Begin therapy 5 TO 7 DAYS AFTER SURGERY  You were given a prescription for therapy at your preoperative office visit  If you do not have physical therapy scheduled yet, please call our office for assistance  FOLLOW-UP APPOINTMENT:  7-10 days after surgery with:    [facility]  [address] [place] [address], [address] [place] [address], [address] [facility]  [phone] ([facility] [address])  [phone] (After-Hours)

## 2022-05-27 ENCOUNTER — OFFICE VISIT (OUTPATIENT)
Dept: OBGYN CLINIC | Facility: CLINIC | Age: 47
End: 2022-05-27

## 2022-05-27 VITALS
DIASTOLIC BLOOD PRESSURE: 80 MMHG | SYSTOLIC BLOOD PRESSURE: 122 MMHG | HEIGHT: 65 IN | WEIGHT: 276 LBS | BODY MASS INDEX: 45.98 KG/M2

## 2022-05-27 DIAGNOSIS — Z98.890 S/P MENISCECTOMY: Primary | ICD-10-CM

## 2022-05-27 PROCEDURE — 99024 POSTOP FOLLOW-UP VISIT: CPT | Performed by: ORTHOPAEDIC SURGERY

## 2022-05-27 PROCEDURE — 3008F BODY MASS INDEX DOCD: CPT | Performed by: ORTHOPAEDIC SURGERY

## 2022-05-27 NOTE — PROGRESS NOTES
Knee Post Operative Visit     Assesment:     51 y o  female 1 week s/p surgical arthroscopy of the right knee with medial meniscectomy and lateral meniscectomy, DOS: 5/18/22    Plan:    Post-Operative treatment:    Ice to knee for 20 minutes at least 1-2 times daily  PT for ROM/strengthening to knee, hip and core  OTC NSAIDS prn for pain  Imaging: All imaging from today was reviewed by myself and explained to the patient  Weight bearing:  as tolerated     ROM:  Full    Brace:  No brace needed    DVT Prophylaxis:   Ambulation    Follow up:   6 weeks    Patient was advised that if they have any fevers, chills, chest pain, shortness of breath, redness or drainage from the incision, please let our office know immediately  Chief Complaint   Patient presents with    Right Knee - Post-op       History of Present Illness: The patient is a 51 y o  female who is being evaluated post operatively 1 week  status post surgical arthroscopy of the right knee with medial meniscectomy and lateral meniscectomy, DOS: 5/18/22  Since the prior visit, She reports significant improvement  Patient states all the pain that she had prior to surgery has completely resolved  Patient reports that she used the crutches minimally the day after surgery  She states that she is off the narcotic medication  She states that she is able to walk without pain  She has great range of motion  She is very happy with her progress so far  Pain is well controlled  The patient is using ice to control swelling  They have not started physical therapy  The patient Aspirin 325 mg oral daily x 3 weeks and Ambulation for DVT ppx  The patient has been ambulating without crutches  The patient has been ambulating without a brace  The patient denies any fevers, chills, calf pain, chest pain/shortness of breath, redness or drainage from the incision           I have reviewed the past medical, surgical, social and family history, medications and allergies as documented in the EMR  Review of systems: ROS is negative other than that noted in the HPI  Constitutional: Negative for fatigue and fever  Physical Exam:    Blood pressure 122/80, height 5' 5" (1 651 m), weight 125 kg (276 lb)  General/Constitutional: NAD, well developed, well nourished  HENT: Normocephalic, atraumatic  CV: Intact distal pulses, regular rate  Resp: No respiratory distress or labored breathing  Lymphatic: No lymphadenopathy palpated  Neuro: Alert and Oriented x 3, no focal deficits  Psych: Normal mood, normal affect, normal judgement, normal behavior  Skin: Warm, dry, no rashes, no erythema      Knee Exam (focused):                   RIGHT LEFT   ROM:   0-130 0-130   Palpation: Effusion negative negative     MJL tenderness Negative Negative     LJL tenderness Negative Negative   Instability: Varus stable stable     Valgus stable stable   Special Tests: Lachman Negative Negative     Posterior drawer Negative Negative     Anterior drawer Negative Negative     Pivot shift not tested not tested     Dial not tested not tested   Patella: Palpation no tenderness no tenderness     Mobility 1/4 1/4     Apprehension Negative Negative   Other: Single leg 1/4 squat not tested not tested      Incisions show no erythema, no drainage    LE NV Exam: +2 DP/PT pulses bilaterally  Sensation intact to light touch L2-S1 bilaterally     Bilateral hip ROM demonstrates no pain actively or passively    No calf tenderness to palpation bilaterally

## 2022-06-02 ENCOUNTER — EVALUATION (OUTPATIENT)
Dept: PHYSICAL THERAPY | Facility: CLINIC | Age: 47
End: 2022-06-02
Payer: COMMERCIAL

## 2022-06-02 DIAGNOSIS — Z98.890 S/P MENISCECTOMY: Primary | ICD-10-CM

## 2022-06-02 DIAGNOSIS — M25.561 CHRONIC PAIN OF RIGHT KNEE: ICD-10-CM

## 2022-06-02 DIAGNOSIS — G89.29 CHRONIC PAIN OF RIGHT KNEE: ICD-10-CM

## 2022-06-02 PROCEDURE — 97161 PT EVAL LOW COMPLEX 20 MIN: CPT | Performed by: PHYSICAL THERAPIST

## 2022-06-02 PROCEDURE — 97110 THERAPEUTIC EXERCISES: CPT | Performed by: PHYSICAL THERAPIST

## 2022-06-02 NOTE — PROGRESS NOTES
PT Evaluation  and PT Discharge    ADDENDUM 22: Pt has not returned for f/u therapy appts and has not called clinic to r/s  Will d/c at this time  Today's date: 2022  Patient name: Erik Bazzi  : 1975  MRN: [id number]  Referring provider: Mary Ellen Lama,*  Dx:   Encounter Diagnosis     ICD-10-CM    1  S/P meniscectomy  Z98 [id number] Ambulatory Referral to Physical Therapy   2  Chronic pain of right knee  [id number]     G89 29                   Assessment  Assessment details: Erik Bazzi is a 51 y o  female presenting to outpatient physical therapy at [place] [address] with complaints of R knee pain s/p meniscectomy on 22   They present with decreased range of motion, decreased strength, limited flexibility, poor postural awareness, poor body mechanics, poor balance, decreased tolerance to activity and decreased functional mobility due to S/P meniscectomy  (primary encounter diagnosis)  Chronic pain of right knee  Suellen Slay would benefit from skilled physical therapy to address noted impairments in order to allow for full functional return to work and ADL-related activities  Thank you for the referral!  Impairments: abnormal muscle firing, abnormal or restricted ROM, activity intolerance, impaired balance, impaired physical strength, lacks appropriate home exercise program, pain with function and poor body mechanics  Barriers to therapy: n/a  Understanding of Dx/Px/POC: excellent  Goals  ST   Independent with HEP in 2 weeks  2  Decrease pain by 50% in 3 wks  3   [facility] R [facility] flexion [facility]     LT  Achieve FOTO score of 79/100 in 6 weeks   2   Able to navigate stairs with alternate step pattern in 6 weeks  3  Strength = 5/5 bilat hip abd in 6 weeks      Plan  Plan details: RE in 6 weeks    Patient would benefit from: skilled physical therapy  Planned modality interventions: cryotherapy, electrical stimulation/Russian stimulation and thermotherapy: hydrocollator packs  Planned therapy interventions: abdominal trunk stabilization, manual therapy, neuromuscular re-education, therapeutic activities, therapeutic exercise, body mechanics training and home exercise program  Frequency: 1x week  Duration in weeks: 6  Plan of Care beginning date: 6/2/2022  Plan of Care expiration date: 7/15/2022  Treatment plan discussed with: patient        Subjective Evaluation    History of Present Illness  Mechanism of injury: Pt is 2 weeks s/p surgical arthroscopy of the right knee with medial meniscectomy and lateral meniscectomy  DOS: 5/18/22    She is WBAT, no restriction on ROM and no brace needed  She is not using crutches  Pt reports she still has pain/pressure at the superior and anterior-medial joint lines  She has been active with work-related activities  Denies instability or balance issues  Positive for stiffness  She is not taking anything for pain mgmt  She works full time as a  for an ER  overnight  She is on full duty, was never on light duty  In her free time she enjoys riding horses and spending time with her grandkids  She has not been riding over the past year d/t the R knee issues, but she is eager to get back  She navigates stairs occasionally with a step-to pattern    Patient Goals  Patient goals for therapy: decreased edema, decreased pain, improved balance, increased motion, return to work, return to [employer] activities, independence with ADLs/IADLs and increased strength          Objective     Observations     Additional Observation Details  Mild to no swelling R knee    Tenderness     Additional Tenderness Details  TTP superior and medial joint lines    R SLR to 90 deg with difficulty secondary to knee pain and hip stiffness  L SLR to 90 deg without issue    Active Range of Motion   Left Knee   Flexion: 114 degrees WFL  Extension: 0 degrees WFL    Right Knee   Flexion: 110 degrees   Extension: 0 degrees WFL    Strength/Myotome Testing     Left Hip   Planes of Motion   Flexion: 5  Extension: 5  Abduction: 4+  Adduction: 5  External rotation: 5  Internal rotation: 5    Isolated Muscles   Gluteus radha: 5  Iliopsoas: 5    Right Hip   Planes of Motion   Flexion: 4  Extension: 5  Abduction: 4  Adduction: 5  External rotation: 5  Internal rotation: 5    Isolated Muscles   Gluteus maximums: 5  Iliopsoas: 4    Left Knee   Flexion: 5  Extension: 5  Quadriceps contraction: good    Right Knee   Flexion: 5  Extension: 5  Quadriceps contraction: good    Functional Assessment        Comments  Gait: slightly antalgic             Precautions: s/p R medial and lateral meniscectomy ---- DOS: 5/18/22  Protocol: WBAT, full ROM, no brace  Other factors:  Pt goals:  EPOC: 7/15/22  F/u with referring:      HEP:  Access Code: [email or web address]  URL: [email or web address]  Date: 06/02/2022  Prepared by: Aristeo Hollow    Exercises  · Sitting Heel Slide with Towel - 15 reps  · Standing Knee Flexion AROM with Chair Support - 15 reps  · Standing Terminal Knee Extension with Resistance - 15 reps  · Standing Hip Extension with Anchored Resistance - 15 reps  · Standing Hip Abduction with Anchored Resistance - 15 reps  · Standing Hip Adduction with Anchored Resistance - 15 reps  · Standing Repeated Hip Flexion with Resistance - 15 reps            Manuals 6/2    FOTO     FOTO   R knee PROM                                                    Neuro Re-Ed             SLS             RB taps             Foam walk             bosu balance             bosu squats             Cone tap                          Ther Ex             bike             Knee flexion w towel x10            HS stretch             HSC             4way hip otb x15            TKE rtb x15            Side step             LAQ             Leg press             Step up fwd             Step up lat                          Ther Activity                                       Gait Training Modalities

## 2022-06-24 ENCOUNTER — APPOINTMENT (OUTPATIENT)
Dept: PHYSICAL THERAPY | Facility: CLINIC | Age: 47
End: 2022-06-24
Payer: COMMERCIAL

## 2022-10-04 ENCOUNTER — TELEPHONE (OUTPATIENT)
Dept: FAMILY MEDICINE CLINIC | Facility: HOSPITAL | Age: 47
End: 2022-10-04

## 2022-10-04 ENCOUNTER — OFFICE VISIT (OUTPATIENT)
Dept: FAMILY MEDICINE CLINIC | Facility: HOSPITAL | Age: 47
End: 2022-10-04
Payer: COMMERCIAL

## 2022-10-04 ENCOUNTER — HOSPITAL ENCOUNTER (OUTPATIENT)
Dept: RADIOLOGY | Facility: HOSPITAL | Age: 47
Discharge: HOME | End: 2022-10-04
Payer: COMMERCIAL

## 2022-10-04 VITALS
SYSTOLIC BLOOD PRESSURE: 138 MMHG | DIASTOLIC BLOOD PRESSURE: 96 MMHG | BODY MASS INDEX: 46.45 KG/M2 | WEIGHT: 278.8 LBS | TEMPERATURE: 97.6 F | HEART RATE: 68 BPM | HEIGHT: 65 IN

## 2022-10-04 DIAGNOSIS — M25.512 ACUTE PAIN OF LEFT SHOULDER: Primary | ICD-10-CM

## 2022-10-04 DIAGNOSIS — M25.512 ACUTE PAIN OF LEFT SHOULDER: ICD-10-CM

## 2022-10-04 PROCEDURE — 73030 X-RAY EXAM OF SHOULDER: CPT

## 2022-10-04 PROCEDURE — 99214 OFFICE O/P EST MOD 30 MIN: CPT | Performed by: INTERNAL MEDICINE

## 2022-10-04 RX ORDER — TRAMADOL HYDROCHLORIDE 50 MG/1
50 TABLET ORAL EVERY 8 HOURS PRN
Qty: 30 TABLET | Refills: 0 | Status: SHIPPED | OUTPATIENT
Start: 2022-10-04

## 2022-10-04 NOTE — PROGRESS NOTES
Name: Sole Hernández      :       MRN: [id number]  Encounter Provider: Nilda Chaidez DO  Encounter Date: 10/4/2022   Encounter department: [facility] [address] [facility]  Acute pain of left shoulder  Comments:  pain L shoulder and lateral cervical spine - wtih weakness/+ drop arm test and some numbess I suspect poss rotator cuff injury, will start with Xray of shoulder but will likely need a MRI, no symptoms or exam abnormality which suggest cervical radiculopathy at this time, will defer PT till see radiology results, heat/ice as well as gentle massage and gentle AROM exercises encouraged, rx for Tramadol sent for pain - SE reviewed including sedation and habit forming narcotic properties, PDMP rx website reviewed and no red flag use noted, d/w pt that this is a one time rx and after this only NSAIDs will be given, will follow  Orders:  -     XR shoulder 2+ vw left; Future; Expected date: 10/04/2022  -     traMADol (Ultram) 50 mg tablet; Take 1 tablet (50 mg total) by mouth every 8 (eight) hours as needed for moderate pain           Subjective      HPI Pt here for an acute visit    On  she was lifting a 45 lb bag of horse feed and felt a twinge in her L shoulder  She noted minimal pain issues with ROM  She noted some worsening discomfort at her shoulder at work overnight Sun - Mon  She went to bed Mon am and woke up and "could not move the L shoulder at all"  She notes if she has to move her LUE she has to use her R UE to move it  Pain currently starts L lower cervical spine and radiates to L posterior and top of shoulder and radiates to just above the L elbow laterally  She is not able to move the LUE d/t pain and weakness  She has a normal hand grasp but can't move at the shoulder  The pain is mostly top of L shoulder and lateral prox UE  The pain is constant "discomfort" and "dull and achy" and then worse when she tries to move the LUE    She notes no numbness/tingling/redness/swelling  She notes some warmth to lateral L shoulder  She has used Aleve and Tylenol w/o great benefit  She is using ice as well  She notes no previous shoulder problems/surgery or h/o neck pain or neck surgery  Review of Systems   Constitutional: Negative for chills and fever  Respiratory: Negative for cough and shortness of breath  Cardiovascular: Negative for chest pain and palpitations  Gastrointestinal: Negative for nausea and vomiting  Musculoskeletal: Positive for arthralgias and neck pain  Negative for gait problem and joint swelling  Skin: Negative for color change, rash and wound  Neurological: Positive for weakness and numbness  Current Outpatient Medications on File Prior to Visit   Medication Sig    citalopram (CeleXA) 40 mg tablet Take 1 tablet by mouth once daily (Patient taking differently: Take 40 mg by mouth every morning)       Objective     /96   Pulse 68   Temp 97 6 °F (36 4 °C) (Tympanic)   Ht 5' 5" (1 651 m)   Wt 126 kg (278 lb 12 8 oz)   BMI 46 39 kg/m²     Physical Exam  Vitals and nursing note reviewed  Constitutional:       General: She is not in acute distress  Appearance: She is well-developed  She is obese  She is not ill-appearing  HENT:      Head: Normocephalic and atraumatic  Eyes:      General:         Right eye: No discharge  Left eye: No discharge  Conjunctiva/sclera: Conjunctivae normal    Neck:      Trachea: No tracheal deviation  Cardiovascular:      Rate and Rhythm: Normal rate and regular rhythm  Heart sounds: Normal heart sounds  No murmur heard  No friction rub  Pulmonary:      Effort: Pulmonary effort is normal  No respiratory distress  Breath sounds: Normal breath sounds  No wheezing, rhonchi or rales  Abdominal:      General: There is no distension  Musculoskeletal:         General: Tenderness present  No swelling or deformity        Cervical back: Neck supple  Comments: Cervical spine: no pain with palp of spinous processes of cervical spine, + tenderness to L trapezius and SCM and paraspinal musculature, + tenderness along coracoid process anteriorly, nml flex/ext of C-spine but slight decrease in rotation B/L and symmetrically  L shoulder: not able to abduct or extend past 30 degress, + drop arm test   Skin:     General: Skin is warm  Coloration: Skin is not pale  Findings: No bruising or rash  Neurological:      Mental Status: She is alert  Sensory: Sensory deficit present  Motor: Weakness present  No abnormal muscle tone  Gait: Gait normal       Deep Tendon Reflexes: Reflexes normal       Comments: Decreased sensation to light touch L lateral prox arm as well as L lateral and medial forearm, 2/4 L bicep reflex, nml hand  but unable to abduct or extend LUE past 30 degrees   Psychiatric:         Behavior: Behavior normal          Thought Content:  Thought content normal          Judgment: Judgment normal        Bebe Soliman DO

## 2022-10-04 NOTE — TELEPHONE ENCOUNTER
40 lb lifting injury, cannot move left arm, extreme pain  LMOM -should she go to ER or be seen in office

## 2022-10-04 NOTE — TELEPHONE ENCOUNTER
[facility] pharmacy stating that CDC guidelines for acute pain Tramadol is 7days instead of 8  21 pills will be dispensed instead of 30  PCB with any questions  none...

## 2022-10-28 ENCOUNTER — HOSPITAL ENCOUNTER (OUTPATIENT)
Dept: RADIOLOGY | Facility: HOSPITAL | Age: 47
Discharge: HOME | End: 2022-10-28
Payer: COMMERCIAL

## 2022-10-28 DIAGNOSIS — M25.512 ACUTE PAIN OF LEFT SHOULDER: ICD-10-CM

## 2022-10-28 DIAGNOSIS — M75.102 TEAR OF LEFT SUPRASPINATUS TENDON: Primary | ICD-10-CM

## 2022-10-28 PROCEDURE — 73221 MRI JOINT UPR EXTREM W/O DYE: CPT

## 2022-10-28 PROCEDURE — G1004 CDSM NDSC: HCPCS

## 2022-11-15 ENCOUNTER — OFFICE VISIT (OUTPATIENT)
Dept: OBGYN CLINIC | Facility: CLINIC | Age: 47
End: 2022-11-15

## 2022-11-15 VITALS
DIASTOLIC BLOOD PRESSURE: 85 MMHG | HEART RATE: 75 BPM | BODY MASS INDEX: 47.32 KG/M2 | SYSTOLIC BLOOD PRESSURE: 143 MMHG | HEIGHT: 65 IN | WEIGHT: 284 LBS

## 2022-11-15 DIAGNOSIS — M75.102 TEAR OF LEFT SUPRASPINATUS TENDON: ICD-10-CM

## 2022-11-15 DIAGNOSIS — F41.1 GENERALIZED ANXIETY DISORDER: ICD-10-CM

## 2022-11-15 RX ORDER — CHLORHEXIDINE GLUCONATE 0.12 MG/ML
15 RINSE ORAL ONCE
OUTPATIENT
Start: 2022-11-15 | End: 2022-11-15

## 2022-11-15 NOTE — PROGRESS NOTES
Ortho Sports Medicine Shoulder New Patient Visit     Assesment:   49 y o  female left shoulder high-grade partial thickness supraspinatus tear, subacromial impingement    Plan:  The patient's diagnosis and treatment were discussed at length today  We discussed no treatment, non-operative treatment, and operative treatment  Explained to the patient the due to the high-grade partial-thickness tear of the supraspinatus as well as profound weakness on physical exam recommend surgical intervention in the form of left shoulder arthroscopy, rotator cuff repair, subacromial decompression, and also stated procedures  I explained that the natural history of high-grade partial-thickness tearing is continued tearing followed by tendon retraction, fatty atrophy, and cuff tear arthropathy  Due to young age and high activity level including her job at a [employer], recommend surgical repair for her to optimize her function  Given that the patient has failed conservative treatment and continues to have severe pain and/or dysfunction that limits their activities of daily living, they would like to proceed with operative intervention  We discussed with the patient the risks of no treatment, non-operative treatment, and operative treatment  The risks of operative intervention were discussed and include but are not limited to: Infection, bleeding, stiffness, loss of range of motion, blood clot, failure of surgery, fracture, delayed union, nonunion, malunion, risk of potential future arthritis, continued problems with swelling, injury to surrounding structures/nerve/artery/vein, recurrence of cysts, failure of hardware, retained hardware and/or foreign body, symptomatic hardware, and continued instability, pain, dysfunction, or disability despite repair  Conservative treatment:    Ice to shoulder 1-2 times daily, for 20 minutes at a time    Pain control with anti-inflammatory medication  Was un able to tolerate a course of home therapy      Imaging: All imaging from today was reviewed by myself and explained to the patient  Injection:     No Injection planned at this time  Surgery: All of the risks and benefits of operative treatment were explained to the patient, as well as the risks and benefits of any alternative treatment options, including nonoperative care  This risks of surgery include, but are not limited to, infection, bleeding, blood clot, damage to nerves/arteries, need for further surgyer, cardiovascular risk, anesthesia risk, and continued pain  The patient understood this and elects to proceed forward with surgical intervention  We will proceed forward with surgical arthroscopy of the shoulder with rotator cuff repair, subacromial decompression , all associated procedures  Follow up:    No follow-ups on file  Chief Complaint   Patient presents with   • Left Shoulder - Pain       History of Present Illness: The patient is a 49 y o , right hand dominant female 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 , referred to me by their primary care physician, seen in clinic for consultation of left shoulder pain  The pain has been present since early October  The pain started after a single incident when she was lifting heavy bags of horse feed  She had instant pain and swelling in the left shoulder  She has been seeing her primary care provider for evaluation of the shoulder pain  They obtained an x-ray and MRI which is available today to review  Since the incident she has had drastically reduced range of motion strength in left shoulder  The pain has decreased recently and is no longer bothering her  She has not received any steroid injections or physical therapy for the shoulder pain  Denies numbness and tingling of left upper extremity          Shoulder Surgical History:  None    Past Medical, Social and Family History:  Past Medical History: Diagnosis Date   • Acute pain of right knee 2018   • Anxiety    • Dental disorder     last assessed: Dec 4, 2014   • Effusion of right knee 2019   • Generalized anxiety disorder 2018     Past Surgical History:   Procedure Laterality Date   •  SECTION      x2   • NM KNEE SCOPE,SINGLE MENISECTOMY Right 2022    Procedure: MENISCECTOMY LATERAL /MEDIAL-arthroscopy;  Surgeon: Claudia Sánchez DO;  Location:  MAIN OR;  Service: Orthopedics     No Known Allergies  Current Outpatient Medications on File Prior to Visit   Medication Sig Dispense Refill   • citalopram (CeleXA) 40 mg tablet Take 1 tablet by mouth once daily (Patient taking differently: Take 40 mg by mouth every morning) 90 tablet 0   • traMADol (Ultram) 50 mg tablet Take 1 tablet (50 mg total) by mouth every 8 (eight) hours as needed for moderate pain 30 tablet 0     No current facility-administered medications on file prior to visit       Social History     Socioeconomic History   • Marital status: Single     Spouse name: Not on file   • Number of children: Not on file   • Years of education: Not on file   • Highest education level: Not on file   Occupational History   • Not on file   Tobacco Use   • Smoking status: Never Smoker   • Smokeless tobacco: Never Used   Vaping Use   • Vaping Use: Never used   Substance and Sexual Activity   • Alcohol use: Yes     Comment: Socially   • Drug use: Never   • Sexual activity: Not on file   Other Topics Concern   • Not on file   Social History Narrative   • Not on file     Social Determinants of Health     Financial Resource Strain: Not on file   Food Insecurity: Not on file   Transportation Needs: Not on file   Physical Activity: Not on file   Stress: Not on file   Social Connections: Not on file   Intimate Partner Violence: Not on file   Housing Stability: Not on file         I have reviewed the past medical, surgical, social and family history, medications and allergies as documented in the EMR  Review of systems: ROS is negative other than that noted in the HPI  Constitutional: Negative for fatigue and fever  HENT: Negative for sore throat  Respiratory: Negative for shortness of breath  Cardiovascular: Negative for chest pain  Gastrointestinal: Negative for abdominal pain  Endocrine: Negative for cold intolerance and heat intolerance  Genitourinary: Negative for flank pain  Musculoskeletal: Negative for back pain  Skin: Negative for rash  Allergic/Immunologic: Negative for immunocompromised state  Neurological: Negative for dizziness  Psychiatric/Behavioral: Negative for agitation  Physical Exam:    Blood pressure 143/85, pulse 75, height 5' 5" (1 651 m), weight 129 kg (284 lb)  General/Constitutional: NAD, well developed, well nourished  HENT: Normocephalic, atraumatic  CV: Intact distal pulses, regular rate  Resp: No respiratory distress or labored breathing  Lymphatic: No lymphadenopathy palpated  Neuro: Alert and Oriented x 3, no focal deficits  Psych: Normal mood, normal affect, normal judgement, normal behavior  Skin: Warm, dry, no rashes, no erythema      Shoulder focused exam:     Visual inspection of the left shoulder demonstrates normal contour without atrophy  No evidence of scapular dyskinesia or atrophy    No scapular winging  Active and passive range of motion demonstrates forward flexion to 60, abduction to 40, extension of 15,  external rotation is 45 with the arm the side, internal rotation to low thoracic   Rotator cuff strength is 4/5 to resisted forward flexion, 4/5 resisted abduction, 4+/5 resisted external rotation, 5/5 resisted internal rotation  No tenderness to palpation at the distal clavicle, AC joint, acromion, or scapular spine  No pain with cross-body adduction  Positive Neer's test,  positive modified Nicholson impingement test  Negative external rotation lag sign  Negative belly press, negative lift-off  Negative speed's and Yergason's test  Negative tenderness to palpation at the bicipital groove  Negative Knott        UE NV Exam: +2 Radial pulses bilaterally  Sensation intact to light touch C5-T1 bilaterally, Radial/median/ulnar nerve motor intact      Bilateral elbow, wrist, and and forearm ROM full, painless with passive ROM, no ttp or crepitance throughout extremities below shoulder joint    Cervical ROM is full without pain, numbness or tingling      Shoulder Imaging    X-rays of the left shoulder were reviewed, which demonstrate joint space narrowing of AC joint and glenohumeral joint  I have reviewed the radiology report and agree with their impression  MRI of the left shoulder were reviewed, which demonstrate high-grade partial thickness tear of supraspinatus encompassing about 80% of the articular sided fibers  There were several fibers intact in the bursal side  Infraspinatus is intact  Subscapularis intact  Biceps tendon with mild tenosynovitis  Type 2 acromion with extensive subacromial bursitis    I have reviewed the radiology report and agree with their impression        Scribe Attestation    I,:   am acting as a scribe while in the presence of the attending physician :       I,:   personally performed the services described in this documentation    as scribed in my presence :

## 2022-11-15 NOTE — PROGRESS NOTES
Ortho Sports Medicine Shoulder New Patient Visit     Assesment:   49 y o  female left shoulder high-grade partial thickness supraspinatus tear, subacromial impingement     Plan:  The patient's diagnosis and treatment were discussed at length today  We discussed no treatment, non-operative treatment, and operative treatment      Explained to the patient the due to the high-grade partial-thickness tear of the supraspinatus as well as profound weakness on physical exam recommend surgical intervention in the form of left shoulder arthroscopy, rotator cuff repair, subacromial decompression, and also stated procedures  I explained that the natural history of high-grade partial-thickness tearing is continued tearing followed by tendon retraction, fatty atrophy, and cuff tear arthropathy  Due to young age and high activity level including her job at a [employer] employee, recommend surgical repair for her to optimize her function      Given that the patient has failed conservative treatment and continues to have severe pain and/or dysfunction that limits their activities of daily living, they would like to proceed with operative intervention  We discussed with the patient the risks of no treatment, non-operative treatment, and operative treatment  The risks of operative intervention were discussed and include but are not limited to: Infection, bleeding, stiffness, loss of range of motion, blood clot, failure of surgery, fracture, delayed union, nonunion, malunion, risk of potential future arthritis, continued problems with swelling, injury to surrounding structures/nerve/artery/vein, recurrence of cysts, failure of hardware, retained hardware and/or foreign body, symptomatic hardware, and continued instability, pain, dysfunction, or disability despite repair          Conservative treatment:     Ice to shoulder 1-2 times daily, for 20 minutes at a time    Pain control with anti-inflammatory medication  Was un able to tolerate a course of home therapy        Imaging:     All imaging from today was reviewed by myself and explained to the patient          Injection:     No Injection planned at this time        Surgery: All of the risks and benefits of operative treatment were explained to the patient, as well as the risks and benefits of any alternative treatment options, including nonoperative care  This risks of surgery include, but are not limited to, infection, bleeding, blood clot, damage to nerves/arteries, need for further surgyer, cardiovascular risk, anesthesia risk, and continued pain  The patient understood this and elects to proceed forward with surgical intervention  We will proceed forward with surgical arthroscopy of the shoulder with rotator cuff repair, subacromial decompression , all associated procedures          Follow up:     No follow-ups on file                Chief Complaint   Patient presents with   • Left Shoulder - Pain         History of Present Illness:     The patient is a 49 y o , right hand dominant female whose occupation is [employer] , referred to me by their primary care physician, seen in clinic for consultation of left shoulder pain  The pain has been present since early October  The pain started after a single incident when she was lifting heavy bags of horse feed  She had instant pain and swelling in the left shoulder  She has been seeing her primary care provider for evaluation of the shoulder pain  They obtained an x-ray and MRI which is available today to review  Since the incident she has had drastically reduced range of motion strength in left shoulder  The pain has decreased recently and is no longer bothering her  She has not received any steroid injections or physical therapy for the shoulder pain    Denies numbness and tingling of left upper extremity            Shoulder Surgical History:  None     Past Medical, Social and Family History:  Medical History        Past Medical History:   Diagnosis Date   • Acute pain of right knee 2018   • Anxiety     • Dental disorder       last assessed: Dec 4, 2014   • Effusion of right knee 2019   • Generalized anxiety disorder 2018         Surgical History         Past Surgical History:   Procedure Laterality Date   •  SECTION         x2   • [place] KNEE SCOPE,SINGLE MENISECTOMY Right 2022     Procedure: MENISCECTOMY LATERAL /MEDIAL-arthroscopy;  Surgeon: Julia Nicholas DO;  Location:  MAIN OR;  Service: Orthopedics         No Known Allergies  Current Outpatient Medications on File Prior to Visit   Medication Sig Dispense Refill   • citalopram (CeleXA) 40 mg tablet Take 1 tablet by mouth once daily (Patient taking differently: Take 40 mg by mouth every morning) 90 tablet 0   • traMADol (Ultram) 50 mg tablet Take 1 tablet (50 mg total) by mouth every 8 (eight) hours as needed for moderate pain 30 tablet 0      No current facility-administered medications on file prior to visit       Social History               Socioeconomic History   • Marital status: Single       Spouse name: Not on file   • Number of children: Not on file   • Years of education: Not on file   • Highest education level: Not on file   Occupational History   • Not on file   Tobacco Use   • Smoking status: Never Smoker   • Smokeless tobacco: Never Used   Vaping Use   • Vaping Use: Never used   Substance and Sexual Activity   • Alcohol use:  Yes       Comment: Socially   • Drug use: Never   • Sexual activity: Not on file   Other Topics Concern   • Not on file   Social History Narrative   • Not on file      Social Determinants of Health      Financial Resource Strain: Not on file   Food Insecurity: Not on file   Transportation Needs: Not on file   Physical Activity: Not on file   Stress: Not on file   Social Connections: Not on file   Intimate Partner Violence: Not on file   Housing Stability: Not on file          I have reviewed the past medical, surgical, social and family history, medications and allergies as documented in the EMR  Review of systems: ROS is negative other than that noted in the HPI  Constitutional: Negative for fatigue and fever  HENT: Negative for sore throat  Respiratory: Negative for shortness of breath  Cardiovascular: Negative for chest pain  Gastrointestinal: Negative for abdominal pain  Endocrine: Negative for cold intolerance and heat intolerance  Genitourinary: Negative for flank pain  Musculoskeletal: Negative for back pain  Skin: Negative for rash  Allergic/Immunologic: Negative for immunocompromised state  Neurological: Negative for dizziness  Psychiatric/Behavioral: Negative for agitation        Physical Exam:     Blood pressure 143/85, pulse 75, height 5' 5" (1 651 m), weight 129 kg (284 lb)       General/Constitutional: NAD, well developed, well nourished  HENT: Normocephalic, atraumatic  CV: Intact distal pulses, regular rate  Resp: No respiratory distress or labored breathing  Lymphatic: No lymphadenopathy palpated  Neuro: Alert and Oriented x 3, no focal deficits  Psych: Normal mood, normal affect, normal judgement, normal behavior  Skin: Warm, dry, no rashes, no erythema        Shoulder focused exam:     Visual inspection of the left shoulder demonstrates normal contour without atrophy  No evidence of scapular dyskinesia or atrophy   No scapular winging  Active and passive range of motion demonstrates forward flexion to 60, abduction to 40, extension of 15,  external rotation is 45 with the arm the side, internal rotation to low thoracic   Rotator cuff strength is 4/5 to resisted forward flexion, 4/5 resisted abduction, 4+/5 resisted external rotation, 5/5 resisted internal rotation  No tenderness to palpation at the distal clavicle, AC joint, acromion, or scapular spine  No pain with cross-body adduction  Positive Neer's test,  positive modified Nicholson impingement test  Negative external rotation lag sign  Negative belly press, negative lift-off  Negative speed's and Yergason's test  Negative tenderness to palpation at the bicipital groove  Negative [place]          UE NV Exam: +2 Radial pulses bilaterally  Sensation intact to light touch C5-T1 bilaterally, Radial/median/ulnar nerve motor intact       Bilateral elbow, wrist, and and forearm ROM full, painless with passive ROM, no ttp or crepitance throughout extremities below shoulder joint     Cervical ROM is full without pain, numbness or tingling        Shoulder Imaging     X-rays of the left shoulder were reviewed, which demonstrate joint space narrowing of AC joint and glenohumeral joint  I have reviewed the radiology report and agree with their impression      MRI of the left shoulder were reviewed, which demonstrate high-grade partial thickness tear of supraspinatus encompassing about 80% of the articular sided fibers  There were several fibers intact in the bursal side  Infraspinatus is intact  Subscapularis intact  Biceps tendon with mild tenosynovitis  Type 2 acromion with extensive subacromial bursitis    I have reviewed the radiology report and agree with their impression              Scribe Attestation    I,:    am acting as a scribe while in the presence of the attending physician :       I,:    personally performed the services described in this documentation    as scribed in my presence  :

## 2022-11-16 RX ORDER — CITALOPRAM 40 MG/1
40 TABLET ORAL DAILY
Qty: 90 TABLET | Refills: 0 | Status: SHIPPED | OUTPATIENT
Start: 2022-11-16

## 2022-12-08 NOTE — PRE-PROCEDURE INSTRUCTIONS
Pre-Surgery Instructions:   Medication Instructions   • citalopram (CeleXA) 40 mg tablet Continue to take as prescribed including DOS with a small sip of water, unless usually taken at night   • traMADol (Ultram) 50 mg tablet Continue to take as prescribed including DOS with a small sip of water, unless usually taken at night    Avoid non-prescribed ASPIRIN, OTC vitamins and NSAIDS prior to surgery  Tylenol okay PRN  Continue scheduled medications excluding DOS  Avoid smoking prior to Surgery   Avoid alcohol, illicit drugs and marijuana for 24 hours prior to DOS  NPO instructions given: no food, water or anything else by mouth after midnight prior to surgery  Shower the night before and the morning of surgery using CHG wash or antibacterial soap if CHG is not indicated  Avoid shaving for 24 hours prior to DOS  Avoid using lotions, powders, oils, makeup, hair products, etc  DOS  Patient given up to date visitor guidelines  A ride home after surgery is needed- failure to have a ride can result in cancellation  Remove jewelry and not to bring valuables DOS  Dentures and contact lenses will have to be removed for surgery  Patient understands he or she will receive a call the afternoon before surgery regarding an arrival time  If you have any changes in your health before DOS please call the surgeon's office  Patient verbalized understanding of all instructions

## 2022-12-11 NOTE — DISCHARGE INSTRUCTIONS
Dr Robbins Loop Repair  What to Expect/Activity  It is normal to have some discomfort in your shoulder for several days  For the next 48 to 72 hours use ice around the shoulder to help reduce the pain and swelling for 20-30 minutes every 1-2 hours while you are awake  Place a pillow underneath your elbow when sitting to help reduce pressure on the shoulder  Sleeping in an upright position for the first two days will help reduce swelling, as well  You are non-weight bearing to your operative arm  Wear your sling at all times  No pendulum exercises or active movement of shoulder joint  We encourage finger, wrist, and elbow range of motion  Do not drive until instructed by Dr Tasneem Duffy  Dressing/Wound Care/Bathing  You may remove your dressing 3 days after surgery  Leave any steri-strips in place  You may shower 3 days after surgery  After showers, pat incisions and cover with band-aids  Change band-aids after showering  No baths, swimming or submerging until discussed at your follow-up appointment  Pain Management/Medications  You may resume your usual medications  You have been provided a prescription for narcotic pain medication  This is to be used only as needed for breakthrough pain that is not controlled with over-the-counter pain medication  Please take the following medications: Take Aspirin 81 mg daily for 14 days (blood clot prevention)  Tylenol/acetaminophen - according to bottle instructions; alternate with Ibuprofen (Advil, Motrin) or other NSAID- according to bottle instructions  While taking your narcotic medication, an over-the-counter stool softener may be helpful to prevent constipation  Please consider taking Nandini-Colace twice daily while taking this medication  Nerve Block: If you received a nerve block today your surgical limb may feel numb with loss of muscle control for up to 18-24 hours after surgery   We recommend taking a dose of pain medication prior to bed tonight, to cover any pain that may occur if your nerve block begins to wear off while you are sleeping  Follow up/Call if:  The findings of your surgery will be explained to you and your family immediately after surgery  However, in the post-operative period, during recovery from anesthesia you may not fully remember or fully understand what was said  This will be explained once again when you return for your post-op appointment and suture removal   If you were provided with photos of your procedure, please bring them to your follow up appointment for explanation    Please contact Dr Vidal Second office if you experience the following:  Excessive bleeding (bleeding through your dressing)  Fever greater than 101 degrees F  Persistent nausea or vomiting  Decreased sensation or discoloration of the operative limb  Pain or swelling that is getting worse and not better with medication    Merlyn Horta [address] [facility],[address]   [address]   [address]   [address] [facility] [address]   [address]  [address]  [address]   [phone]

## 2022-12-12 ENCOUNTER — ANESTHESIA EVENT (OUTPATIENT)
Dept: PERIOP | Facility: HOSPITAL | Age: 47
End: 2022-12-12

## 2022-12-12 NOTE — H&P
Ortho Sports Medicine Shoulder New Patient Visit     Assesment:   49 y  o  female left shoulder high-grade partial thickness supraspinatus tear, subacromial impingement     Plan:  The patient's diagnosis and treatment were discussed at length today  We discussed no treatment, non-operative treatment, and operative treatment      Explained to the patient the due to the high-grade partial-thickness tear of the supraspinatus as well as profound weakness on physical exam recommend surgical intervention in the form of left shoulder arthroscopy, rotator cuff repair, subacromial decompression, and also stated procedures   I explained that the natural history of high-grade partial-thickness tearing is continued tearing followed by tendon retraction, fatty atrophy, and cuff tear arthropathy   Due to young age and high activity level including her job at a [employer], recommend surgical repair for her to optimize her function      Given that the patient has failed conservative treatment and continues to have severe pain and/or dysfunction that limits their activities of daily living, they would like to proceed with operative intervention  We discussed with the patient the risks of no treatment, non-operative treatment, and operative treatment  The risks of operative intervention were discussed and include but are not limited to: Infection, bleeding, stiffness, loss of range of motion, blood clot, failure of surgery, fracture, delayed union, nonunion, malunion, risk of potential future arthritis, continued problems with swelling, injury to surrounding structures/nerve/artery/vein, recurrence of cysts, failure of hardware, retained hardware and/or foreign body, symptomatic hardware, and continued instability, pain, dysfunction, or disability despite repair          Conservative treatment:     Ice to shoulder 1-2 times daily, for 20 minutes at a time    Pain control with anti-inflammatory medication  Was un able to tolerate a course of home therapy        Imaging:     All imaging from today was reviewed by myself and explained to the patient          Injection:     No Injection planned at this time        Surgery:     All of the risks and benefits of operative treatment were explained to the patient, as well as the risks and benefits of any alternative treatment options, including nonoperative care  This risks of surgery include, but are not limited to, infection, bleeding, blood clot, damage to nerves/arteries, need for further surgyer, cardiovascular risk, anesthesia risk, and continued pain   The patient understood this and elects to proceed forward with surgical intervention    We will proceed forward with surgical arthroscopy of the shoulder with rotator cuff repair, subacromial decompression , all associated procedures          Follow up:     No follow-ups on file                  Chief Complaint   Patient presents with   • Left Shoulder - Pain         History of Present Illness:     The patient is a 47 y o , right hand dominant female 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 , referred to me by their primary care physician, seen in clinic for consultation of left shoulder pain   The pain has been present since early October   The pain started after a single incident when she was lifting heavy bags of horse feed   She had instant pain and swelling in the left shoulder   She has been seeing her primary care provider for evaluation of the shoulder pain   They obtained an x-ray and MRI which is available today to review   Since the incident she has had drastically reduced range of motion strength in left shoulder   The pain has decreased recently and is no longer bothering her  Marilu Cast has not received any steroid injections or physical therapy for the shoulder pain   Denies numbness and tingling of left upper extremity            Shoulder Surgical History:  None     Past Medical, Social and Family History:  Medical History           Past Medical History:   Diagnosis Date   • Acute pain of right knee 2018   • Anxiety     • Dental disorder       last assessed: Dec 4, 2014   • Effusion of right knee 2019   • Generalized anxiety disorder 2018         Surgical History             Past Surgical History:   Procedure Laterality Date   •  SECTION         x2   • [place] KNEE SCOPE,SINGLE MENISECTOMY Right 2022     Procedure: MENISCECTOMY LATERAL /MEDIAL-arthroscopy;  Surgeon: Dar Cheng DO;  Location:  MAIN OR;  Service: Orthopedics         No Known Allergies         Current Outpatient Medications on File Prior to Visit   Medication Sig Dispense Refill   • citalopram (CeleXA) 40 mg tablet Take 1 tablet by mouth once daily (Patient taking differently: Take 40 mg by mouth every morning) 90 tablet 0   • traMADol (Ultram) 50 mg tablet Take 1 tablet (50 mg total) by mouth every 8 (eight) hours as needed for moderate pain 30 tablet 0      No current facility-administered medications on file prior to visit       Social History                   Socioeconomic History   • Marital status: Single       Spouse name: Not on file   • Number of children: Not on file   • Years of education: Not on file   • Highest education level: Not on file   Occupational History   • Not on file   Tobacco Use   • Smoking status: Never Smoker   • Smokeless tobacco: Never Used   Vaping Use   • Vaping Use: Never used   Substance and Sexual Activity   • Alcohol use:  Yes       Comment: Socially   • Drug use: Never   • Sexual activity: Not on file   Other Topics Concern   • Not on file   Social History Narrative   • Not on file      Social Determinants of Health      Financial Resource Strain: Not on file   Food Insecurity: Not on file   Transportation Needs: Not on file   Physical Activity: Not on file   Stress: Not on file   Social Connections: Not on file   Intimate Partner Violence: Not on file   Housing Stability: Not on file               I have reviewed the past medical, surgical, social and family history, medications and allergies as documented in the EMR  Review of systems: ROS is negative other than that noted in the HPI  Constitutional: Negative for fatigue and fever  HENT: Negative for sore throat     Respiratory: Negative for shortness of breath     Cardiovascular: Negative for chest pain  Gastrointestinal: Negative for abdominal pain  Endocrine: Negative for cold intolerance and heat intolerance  Genitourinary: Negative for flank pain  Musculoskeletal: Negative for back pain  Skin: Negative for rash  Allergic/Immunologic: Negative for immunocompromised state  Neurological: Negative for dizziness  Psychiatric/Behavioral: Negative for agitation        Physical Exam:     Blood pressure 143/85, pulse 75, height 5' 5" (1 651 m), weight 129 kg (284 lb)       General/Constitutional: NAD, well developed, well nourished  HENT: Normocephalic, atraumatic  CV: Intact distal pulses, regular rate  Resp: No respiratory distress or labored breathing  Lymphatic: No lymphadenopathy palpated  Neuro: Alert and Oriented x 3, no focal deficits  Psych: Normal mood, normal affect, normal judgement, normal behavior  Skin: Warm, dry, no rashes, no erythema        Shoulder focused exam:     Visual inspection of the left shoulder demonstrates normal contour without atrophy  No evidence of scapular dyskinesia or atrophy   No scapular winging  Active and passive range of motion demonstrates forward flexion to 60, abduction to 40, extension of 15,  external rotation is 45 with the arm the side, internal rotation to low thoracic   Rotator cuff strength is 4/5 to resisted forward flexion, 4/5 resisted abduction, 4+/5 resisted external rotation, 5/5 resisted internal rotation  No tenderness to palpation at the distal clavicle, AC joint, acromion, or scapular spine  No pain with cross-body adduction  Positive Neer's test,  positive modified Nicholson impingement test  Negative external rotation lag sign  Negative belly press, negative lift-off  Negative speed's and Yergason's test  Negative tenderness to palpation at the bicipital groove  Negative [place]          UE NV Exam: +2 Radial pulses bilaterally  Sensation intact to light touch C5-T1 bilaterally, Radial/median/ulnar nerve motor intact       Bilateral elbow, wrist, and and forearm ROM full, painless with passive ROM, no ttp or crepitance throughout extremities below shoulder joint     Cervical ROM is full without pain, numbness or tingling        Shoulder Imaging     X-rays of the left shoulder were reviewed, which demonstrate joint space narrowing of AC joint and glenohumeral joint   I have reviewed the radiology report and agree with their impression      MRI of the left shoulder were reviewed, which demonstrate high-grade partial thickness tear of supraspinatus encompassing about 80% of the articular sided fibers   There were several fibers intact in the bursal side   Infraspinatus is intact   Subscapularis intact   Biceps tendon with mild tenosynovitis   Type 2 acromion with extensive subacromial bursitis  Lanette Tobias have reviewed the radiology report and agree with their impression                 Scribe Attestation    I,:    am acting as a scribe while in the presence of the attending physician :       I,:    personally performed the services described in this documentation    as scribed in my presence  :

## 2022-12-13 ENCOUNTER — HOSPITAL ENCOUNTER (OUTPATIENT)
Facility: HOSPITAL | Age: 47
Setting detail: OUTPATIENT SURGERY
Discharge: HOME | End: 2022-12-13
Attending: STUDENT IN AN ORGANIZED HEALTH CARE EDUCATION/TRAINING PROGRAM | Admitting: STUDENT IN AN ORGANIZED HEALTH CARE EDUCATION/TRAINING PROGRAM

## 2022-12-13 ENCOUNTER — ANESTHESIA (OUTPATIENT)
Dept: PERIOP | Facility: HOSPITAL | Age: 47
End: 2022-12-13

## 2022-12-13 VITALS
DIASTOLIC BLOOD PRESSURE: 81 MMHG | OXYGEN SATURATION: 94 % | SYSTOLIC BLOOD PRESSURE: 141 MMHG | TEMPERATURE: 98.6 F | WEIGHT: 277.56 LBS | HEART RATE: 74 BPM | RESPIRATION RATE: 12 BRPM | BODY MASS INDEX: 46.24 KG/M2 | HEIGHT: 65 IN

## 2022-12-13 DIAGNOSIS — M75.42 IMPINGEMENT SYNDROME OF LEFT SHOULDER: ICD-10-CM

## 2022-12-13 DIAGNOSIS — M75.42 SUBACROMIAL IMPINGEMENT OF LEFT SHOULDER: Primary | ICD-10-CM

## 2022-12-13 DIAGNOSIS — M75.102 TEAR OF LEFT SUPRASPINATUS TENDON: ICD-10-CM

## 2022-12-13 DIAGNOSIS — M19.012 PRIMARY OSTEOARTHRITIS OF LEFT SHOULDER: Primary | ICD-10-CM

## 2022-12-13 LAB
EXT PREGNANCY TEST URINE: NEGATIVE
EXT. CONTROL: NORMAL

## 2022-12-13 RX ORDER — ONDANSETRON 2 MG/ML
INJECTION INTRAMUSCULAR; INTRAVENOUS AS NEEDED
Status: DISCONTINUED | OUTPATIENT
Start: 2022-12-13 | End: 2022-12-13

## 2022-12-13 RX ORDER — NEOSTIGMINE METHYLSULFATE 1 MG/ML
INJECTION INTRAVENOUS AS NEEDED
Status: DISCONTINUED | OUTPATIENT
Start: 2022-12-13 | End: 2022-12-13

## 2022-12-13 RX ORDER — DEXAMETHASONE SODIUM PHOSPHATE 10 MG/ML
INJECTION, SOLUTION INTRAMUSCULAR; INTRAVENOUS AS NEEDED
Status: DISCONTINUED | OUTPATIENT
Start: 2022-12-13 | End: 2022-12-13

## 2022-12-13 RX ORDER — SODIUM CHLORIDE, SODIUM LACTATE, POTASSIUM CHLORIDE, CALCIUM CHLORIDE 600; 310; 30; 20 MG/100ML; MG/100ML; MG/100ML; MG/100ML
INJECTION, SOLUTION INTRAVENOUS CONTINUOUS PRN
Status: DISCONTINUED | OUTPATIENT
Start: 2022-12-13 | End: 2022-12-13

## 2022-12-13 RX ORDER — LIDOCAINE HYDROCHLORIDE 10 MG/ML
0.5 INJECTION, SOLUTION EPIDURAL; INFILTRATION; INTRACAUDAL; PERINEURAL ONCE AS NEEDED
Status: DISCONTINUED | OUTPATIENT
Start: 2022-12-13 | End: 2022-12-13

## 2022-12-13 RX ORDER — GLYCOPYRROLATE 0.2 MG/ML
INJECTION INTRAMUSCULAR; INTRAVENOUS AS NEEDED
Status: DISCONTINUED | OUTPATIENT
Start: 2022-12-13 | End: 2022-12-13

## 2022-12-13 RX ORDER — CHLORHEXIDINE GLUCONATE 0.12 MG/ML
15 RINSE ORAL ONCE
Status: DISCONTINUED | OUTPATIENT
Start: 2022-12-13 | End: 2022-12-13

## 2022-12-13 RX ORDER — ASPIRIN 81 MG/1
81 TABLET ORAL 2 TIMES DAILY
Qty: 60 TABLET | Refills: 0 | Status: SHIPPED | OUTPATIENT
Start: 2022-12-13 | End: 2023-01-12

## 2022-12-13 RX ORDER — ONDANSETRON 2 MG/ML
4 INJECTION INTRAMUSCULAR; INTRAVENOUS ONCE AS NEEDED
Status: DISCONTINUED | OUTPATIENT
Start: 2022-12-13 | End: 2022-12-13 | Stop reason: HOSPADM

## 2022-12-13 RX ORDER — OXYCODONE HYDROCHLORIDE 5 MG/1
5 TABLET ORAL EVERY 4 HOURS PRN
Status: DISCONTINUED | OUTPATIENT
Start: 2022-12-13 | End: 2022-12-13 | Stop reason: HOSPADM

## 2022-12-13 RX ORDER — KETOROLAC TROMETHAMINE 30 MG/ML
INJECTION, SOLUTION INTRAMUSCULAR; INTRAVENOUS AS NEEDED
Status: DISCONTINUED | OUTPATIENT
Start: 2022-12-13 | End: 2022-12-13

## 2022-12-13 RX ORDER — FENTANYL CITRATE 50 UG/ML
INJECTION, SOLUTION INTRAMUSCULAR; INTRAVENOUS AS NEEDED
Status: DISCONTINUED | OUTPATIENT
Start: 2022-12-13 | End: 2022-12-13

## 2022-12-13 RX ORDER — SODIUM CHLORIDE, SODIUM LACTATE, POTASSIUM CHLORIDE, CALCIUM CHLORIDE 600; 310; 30; 20 MG/100ML; MG/100ML; MG/100ML; MG/100ML
125 INJECTION, SOLUTION INTRAVENOUS CONTINUOUS
Status: DISCONTINUED | OUTPATIENT
Start: 2022-12-13 | End: 2022-12-13 | Stop reason: HOSPADM

## 2022-12-13 RX ORDER — TRAMADOL HYDROCHLORIDE 50 MG/1
50 TABLET ORAL EVERY 6 HOURS PRN
Status: DISCONTINUED | OUTPATIENT
Start: 2022-12-13 | End: 2022-12-13 | Stop reason: HOSPADM

## 2022-12-13 RX ORDER — MIDAZOLAM HYDROCHLORIDE 2 MG/2ML
INJECTION, SOLUTION INTRAMUSCULAR; INTRAVENOUS AS NEEDED
Status: DISCONTINUED | OUTPATIENT
Start: 2022-12-13 | End: 2022-12-13

## 2022-12-13 RX ORDER — BUPIVACAINE HYDROCHLORIDE 5 MG/ML
INJECTION, SOLUTION EPIDURAL; INTRACAUDAL AS NEEDED
Status: DISCONTINUED | OUTPATIENT
Start: 2022-12-13 | End: 2022-12-13

## 2022-12-13 RX ORDER — ACETAMINOPHEN 325 MG/1
650 TABLET ORAL EVERY 6 HOURS PRN
Status: DISCONTINUED | OUTPATIENT
Start: 2022-12-13 | End: 2022-12-13 | Stop reason: HOSPADM

## 2022-12-13 RX ORDER — OXYCODONE HYDROCHLORIDE 5 MG/1
5 TABLET ORAL EVERY 4 HOURS PRN
Qty: 20 TABLET | Refills: 0 | Status: SHIPPED | OUTPATIENT
Start: 2022-12-13 | End: 2022-12-18

## 2022-12-13 RX ORDER — CEFAZOLIN SODIUM 1 G/50ML
1000 SOLUTION INTRAVENOUS ONCE
Status: COMPLETED | OUTPATIENT
Start: 2022-12-13 | End: 2022-12-13

## 2022-12-13 RX ORDER — HYDROMORPHONE HCL/PF 1 MG/ML
0.5 SYRINGE (ML) INJECTION
Status: DISCONTINUED | OUTPATIENT
Start: 2022-12-13 | End: 2022-12-13 | Stop reason: HOSPADM

## 2022-12-13 RX ORDER — ROCURONIUM BROMIDE 10 MG/ML
INJECTION, SOLUTION INTRAVENOUS AS NEEDED
Status: DISCONTINUED | OUTPATIENT
Start: 2022-12-13 | End: 2022-12-13

## 2022-12-13 RX ORDER — PROPOFOL 10 MG/ML
INJECTION, EMULSION INTRAVENOUS AS NEEDED
Status: DISCONTINUED | OUTPATIENT
Start: 2022-12-13 | End: 2022-12-13

## 2022-12-13 RX ORDER — METOCLOPRAMIDE HYDROCHLORIDE 5 MG/ML
10 INJECTION INTRAMUSCULAR; INTRAVENOUS ONCE AS NEEDED
Status: DISCONTINUED | OUTPATIENT
Start: 2022-12-13 | End: 2022-12-13 | Stop reason: HOSPADM

## 2022-12-13 RX ORDER — CEFAZOLIN SODIUM 2 G/50ML
2000 SOLUTION INTRAVENOUS ONCE
Status: COMPLETED | OUTPATIENT
Start: 2022-12-13 | End: 2022-12-13

## 2022-12-13 RX ADMIN — BUPIVACAINE HYDROCHLORIDE 7 ML: 5 INJECTION, SOLUTION EPIDURAL; INTRACAUDAL at 12:25

## 2022-12-13 RX ADMIN — FENTANYL CITRATE 50 MCG: 50 INJECTION, SOLUTION INTRAMUSCULAR; INTRAVENOUS at 15:17

## 2022-12-13 RX ADMIN — FENTANYL CITRATE 50 MCG: 50 INJECTION, SOLUTION INTRAMUSCULAR; INTRAVENOUS at 12:14

## 2022-12-13 RX ADMIN — BUPIVACAINE 20 ML: 13.3 INJECTION, SUSPENSION, LIPOSOMAL INFILTRATION at 12:25

## 2022-12-13 RX ADMIN — PHENYLEPHRINE HYDROCHLORIDE 50 MCG/MIN: 10 INJECTION INTRAVENOUS at 15:38

## 2022-12-13 RX ADMIN — LIDOCAINE HYDROCHLORIDE 100 MG: 20 INJECTION INTRAVENOUS at 15:17

## 2022-12-13 RX ADMIN — GLYCOPYRROLATE 0.4 MG: 0.2 INJECTION, SOLUTION INTRAMUSCULAR; INTRAVENOUS at 16:38

## 2022-12-13 RX ADMIN — MIDAZOLAM 1 MG: 1 INJECTION INTRAMUSCULAR; INTRAVENOUS at 15:09

## 2022-12-13 RX ADMIN — SODIUM CHLORIDE, SODIUM LACTATE, POTASSIUM CHLORIDE, AND CALCIUM CHLORIDE: .6; .31; .03; .02 INJECTION, SOLUTION INTRAVENOUS at 15:08

## 2022-12-13 RX ADMIN — PROPOFOL 200 MG: 10 INJECTION, EMULSION INTRAVENOUS at 15:17

## 2022-12-13 RX ADMIN — ONDANSETRON 4 MG: 2 INJECTION INTRAMUSCULAR; INTRAVENOUS at 15:44

## 2022-12-13 RX ADMIN — MIDAZOLAM 1 MG: 1 INJECTION INTRAMUSCULAR; INTRAVENOUS at 12:14

## 2022-12-13 RX ADMIN — DEXAMETHASONE SODIUM PHOSPHATE 10 MG: 10 INJECTION, SOLUTION INTRAMUSCULAR; INTRAVENOUS at 15:17

## 2022-12-13 RX ADMIN — KETOROLAC TROMETHAMINE 15 MG: 30 INJECTION, SOLUTION INTRAMUSCULAR; INTRAVENOUS at 15:44

## 2022-12-13 RX ADMIN — ROCURONIUM BROMIDE 50 MG: 10 INJECTION INTRAVENOUS at 15:17

## 2022-12-13 RX ADMIN — NEOSTIGMINE METHYLSULFATE 2.5 MG: 1 INJECTION INTRAVENOUS at 16:38

## 2022-12-13 RX ADMIN — CEFAZOLIN SODIUM 2000 MG: 2 SOLUTION INTRAVENOUS at 15:19

## 2022-12-13 RX ADMIN — SODIUM CHLORIDE, SODIUM LACTATE, POTASSIUM CHLORIDE, AND CALCIUM CHLORIDE: .6; .31; .03; .02 INJECTION, SOLUTION INTRAVENOUS at 10:23

## 2022-12-13 RX ADMIN — CEFAZOLIN SODIUM 1000 MG: 1 SOLUTION INTRAVENOUS at 15:32

## 2022-12-13 RX ADMIN — PROPOFOL 50 MG: 10 INJECTION, EMULSION INTRAVENOUS at 16:33

## 2022-12-13 NOTE — PROGRESS NOTES
PT protocol and referral for s/p left shoulder arthroscopy with subacromial decompression and biceps tenotomy  Shoulder Arthroscopy    Subacromial Decompression +- Distal Clavicle Excision  Rehabilitation Guidelines      PHASE 1:  Weeks 0-4    • Sling worn as needed for comfort only  • Use ice / cryotherapy for swelling and pain control  • Showering allowed on POD #3 after dressing removed  Leave steri-strips in place  • Driving is permitted at 2 weeks if weaned off of narcotic pain medications     EXERCISES:  • Begin with gentle shoulder PROM and pendulum exercises - supported with contralateral arm as needed  • Progress to shoulder AAROM in all planes as tolerated  Self, cane, or pulley assist   No resistive exercises yet  • Elbow, wrist, hand AROM exercises  •  strengthening  • Scapular stabilizations - retraction, depression, protraction      PHASE 2:  Weeks 4-8    • Normalize shoulder ROM - Physical therapy 2x per week or as determined by therapist  • Discontinue sling as tolerated    EXERCISES:  • Minimal manual resistance for isometric ER/IR at 0?, 45?, and  90?in supine with arm supported as needed  • Minimal manual resistance for rhythmic stabilization of glenohumeral joint at 60?, 90?, 120?, etc  in supine  • AAROM progressing to AROM for elevation in supine    Elevate head of bed PRN maintaining good mechanics  • AAROM progressing to AROM PNF D1/D2 diagonals in supine  • ER in side-lying  • Posterior capsule stretches - sleeper stretch, cross body adduction stretch, etc   • Light periscapular strengthening as appropriate (prone rowing, prone shoulder extension)  • Progress cuff/periscapular/deltoid strengthening as full ROM is achieved - Do not strengthen through pain  • Focus on proper mechanics of the shoulder with active movements and functional activities      PHASE 3:  Weeks 8-12 or Home Exercise Program  • Progress to full ROM with discomfort  • Advance strengthening as tolerated, including eccentrics and closed-chain activities

## 2022-12-13 NOTE — OP NOTE
OPERATIVE REPORT  PATIENT NAME: Polo Barajas    :  1975  MRN: [id number]  Pt Location: [facility] OR ROOM 02    SURGERY DATE: 2022    Surgeon(s) and Role:     * Anastacio Edmonds, DO - Primary     * Em Jackman, [facility] - Assisting     * Parisa Mejia  MS, ATC - Assisting    Preop Diagnosis:  Tear of left supraspinatus tendon [M75 102]    Post-Op Diagnosis Codes:     * Tear of left supraspinatus tendon [M75 102]    PROCEDURE:  ARTHROSCOPIC DEBRIDEMENT ROTATOR CUFF  ARTHROSCOPIC SUBACROMIAL DECOMPRESSION  ARTHROSCOPIC BICEPS TENOTOMY  CLOSED MANIPULATION UNDER ANESTHESIA  ARTHROSCOPIC LYSIS OF ADHESIONS    Specimen(s):  * No specimens in log *    Estimated Blood Loss:   Minimal    Drains:  * No LDAs found *    Anesthesia Type:   General w/ Regional    Operative Indications:  Tear of left supraspinatus tendon [M75 102]  The patient is a very active 49year-old  who comes in to me with significant problems associated with left shoulder  After failure of conservative nonoperative care, eventually came and saw me, on physical examination with x-ray and radiographic findings, found to have a partial thickness rotator cuff tear, subacromial impingement, biceps tendonitis and SLAP tear, glenohumeral osteoarthritis  The risks and benefits of surgical intervention were explained including, but not exclusive to, infection, DVT, loss of range of motion, stiffness, continuance of pain, failure, fracture, dislocation, delayed union, malunion, nonunion, wound complications, and neurovascular compromise  Operative Findings:  Glenohumeral osteoarthritis  Type II SLAP tear and biceps tenosynovitis/tear  Secondary adhesive capsulitis  Intra-articular adhesions  Partial-thickness articular sided rotator cuff tear, approximately 40% of tendon thickness    Complications:   None    Procedure and Technique:  Patient was seen examined in the preoperative holding area    Patient's identity was confirmed with the wrist band and hospital chart  The operative extremity was marked in the laterality was confirmed and signed by both the patient and myself matching the consent  Patient was brought to the operating room where there placed supine in a beach chair position  Anesthesia was induced  All bony prominences were padded  A wedge was placed below the legs  Patient was positioned upright in a beach chair position using the foam head positioner in a neutral cervical alignment  The well arm was placed on a padded arm bach  The operative arm was evaluated under anesthesia and demonstrated limited forward flexion to 100 degrees, Abduction to 80, Extension to 7, ER to 70 in abduction, IR to L5  A closed manipulation under anesthesia was performed while stabilizing both the humeral shaft and scapula  Appreciable crepitus was felt and improvements in range of motion were appreciated  Shoulder was sterilely prepped and draped  A formal time-out was performed  Standard posterior portal was used and examination of the intraarticular portion of joint revealed patient had  grade 3 changes to the humeral head and grade 4 changes to the glenoid  Anterior incision made using a spinal needle via outside in technique  We debrided the superior labral anterior, posterior back to a stable rim  We did a synovectomy within the joint  The rotator interval and anterior capsule demonstrate significant adhesions and were lysed with the radiofrequency wand  The biceps tendon was examined and found to be  significantly tenosynovitis with a high-grade partial-thickness tear  The biceps tenotomy was performed and the stump was stabilized with the vapor     Evaluation of the undersurface of the rotator cuff demonstrated partial-thickness articular sided fraying and tearing without any full-thickness component  This region was marked with a spinal needle and PDS suture for later visualization from subacromial space    The undersurface of the rotator cuff was debrided thoroughly    Shoulder was copiously irrigated and drained  We went to the subacromial space, made a standard lateral incision  We did an extensive bursectomy , removed the soft tissue from the undersurface of the acromion  There was a type 3 acromion  Then viewing laterally, working posteriorly, using the posterior aspect of the acromion as a cutting block, we turned the type 3 acromion into a type 1 acromion  There was  minimal erythema in the [facility] joint area  Evaluation of the rotator cuff showed no full-thickness defect on the tuberosity  The region of the previously placed spinal needle demonstrated intact bursal surface and the extent of the tear was estimated to be approximately 40% of the total rotator cuff  This region was probed extensively and the probe could not be buried  Decision was made to not complete any partial-thickness tear  The patient tolerated this very well  The shoulder was copiously irrigated and drained  The wounds were closed with 3-0 Nylon interrupted sutures  A sterile dressing was placed on the shoulder  The patient had good capillary refill  The patient was awoken in the operating room and brought to recovery room in stable condition in an abduction splint  There transported to the PACU in stable condition        I was present for the entire procedure, A qualified resident physician was not available and A physician assistant was required during the procedure for retraction tissue handling,dissection and suturing    Patient Disposition:  PACU         SIGNATURE: Yamilka Gaines DO  DATE: December 13, 2022  TIME: 4:52 PM

## 2022-12-13 NOTE — ANESTHESIA POSTPROCEDURE EVALUATION
Post-Op Assessment Note    CV Status:  Stable  Pain Score: 0    Pain management: adequate  Multimodal analgesia used between 6 hours prior to anesthesia start to PACU discharge    Mental Status:  Sleepy and arousable   Hydration Status:  Euvolemic   PONV Controlled:  None   Airway Patency:  Patent  Airway: intubated      Post Op Vitals Reviewed: Yes      Staff: CRNA         No notable events documented      BP   143/67   Temp   97 8   Pulse  69   Resp   18   SpO2   97%

## 2022-12-13 NOTE — LETTER
[facility] OPERATING ROOM  [address]   [place] [address]  Dept: [phone]    December 13, 2022     Patient: Yarely Yang   YOB: 1975   Date of Visit: 12/13/2022       To Whom it May Concern:    Dougie Veliz is under my professional care  She was seen in the hospital on December 13th, 2022 for shoulder surgery  She is to remain out of work until Tuesday December 27, 2022  We will re-evaluate her status at a later time     If you have any questions or concerns, please don't hesitate to call           Sincerely,          Ines Vazquez PA-C

## 2022-12-13 NOTE — INTERVAL H&P NOTE
H&P reviewed  After examining the patient I find no changes in the patients condition since the H&P had been written      Vitals:    12/13/22 1121   BP: 146/87   Pulse: 75   Resp: 18   Temp: 98 4 °F (36 9 °C)   SpO2: 96%

## 2022-12-13 NOTE — ANESTHESIA PROCEDURE NOTES
Peripheral Block    Patient location during procedure: holding area  Start time: 12/13/2022 12:13 PM  Reason for block: at surgeon's request and post-op pain management  Staffing  Performed: Anesthesiologist   Anesthesiologist: Aarti Figueroa MD  Preanesthetic Checklist  Completed: patient identified, IV checked, site marked, risks and benefits discussed, surgical consent, monitors and equipment checked, pre-op evaluation and timeout performed  Peripheral Block  Patient position: Semi-recumbent  Prep: ChloraPrep  Patient monitoring: heart rate and continuous pulse ox  Block type: interscalene  Laterality: left  Injection technique: single-shot  Procedures: ultrasound guided, Ultrasound guidance required for the procedure to increase accuracy and safety of medication placement and decrease risk of complications  Ultrasound permanent image saved  Needle  Needle type: Stimuplex   Needle gauge: 20G  Needle length: 10 cm  Needle localization: ultrasound guidance  Needle insertion depth: 2 cm  Assessment  Injection assessment: incremental injection, local visualized surrounding nerve on ultrasound, negative aspiration for CSF, negative aspiration for heme and no paresthesia on injection  Paresthesia pain: none  Heart rate change: no  Slow fractionated injection: yes  Post-procedure:  site cleaned  patient tolerated the procedure well with no immediate complications  Additional Notes  Extra time needed for difficult visualization of Plexus

## 2022-12-13 NOTE — ANESTHESIA PREPROCEDURE EVALUATION
Procedure:  REPAIR ROTATOR CUFF  ARTHROSCOPIC (Left: Shoulder)  ARTHROSCOPIC SUBACROMIAL DECOMPRESSION (Left: Shoulder)    Relevant Problems   MUSCULOSKELETAL   (+) Osteoarthrosis, localized, primary, knee, right      NEURO/PSYCH   (+) Anxiety   (+) Generalized anxiety disorder      Other   (+) Morbid obesity (HCC) (BMI-47)        Physical Exam    Airway    Mallampati score: II  TM Distance: >3 FB  Neck ROM: full     Dental   upper dentures,     Cardiovascular      Pulmonary      Other Findings        Anesthesia Plan  ASA Score- 3     Anesthesia Type- general with ASA Monitors  Additional Monitors:   Airway Plan: ETT  Comment: Interscalene Block planned  Plan Factors-    Chart reviewed  Patient is not a current smoker  Induction- intravenous  Postoperative Plan-     Informed Consent- Anesthetic plan and risks discussed with patient  I personally reviewed this patient with the CRNA  Discussed and agreed on the Anesthesia Plan with the CRNA  Lashawn Pratt Discussed with Patient the procedure for ISB, side effects including extended numbness of the extremity and potential for an incomplete Block  All questions answered  Consent given

## 2022-12-14 ENCOUNTER — TELEPHONE (OUTPATIENT)
Dept: OBGYN CLINIC | Facility: HOSPITAL | Age: 47
End: 2022-12-14

## 2022-12-14 NOTE — TELEPHONE ENCOUNTER
Caller: Patient    Doctor: Dr Tasneem Duffy    Reason for call: Patient wondering how soon she can go back to the gym and ride a bike    Caller asking to speak to clinical team       Call back#: [phone]

## 2022-12-28 ENCOUNTER — OFFICE VISIT (OUTPATIENT)
Dept: OBGYN CLINIC | Facility: CLINIC | Age: 47
End: 2022-12-28

## 2022-12-28 VITALS
BODY MASS INDEX: 46.15 KG/M2 | DIASTOLIC BLOOD PRESSURE: 85 MMHG | SYSTOLIC BLOOD PRESSURE: 133 MMHG | WEIGHT: 277 LBS | HEIGHT: 65 IN | HEART RATE: 73 BPM

## 2022-12-28 DIAGNOSIS — M75.42 IMPINGEMENT SYNDROME OF LEFT SHOULDER: Primary | ICD-10-CM

## 2022-12-28 DIAGNOSIS — M75.102 TEAR OF LEFT SUPRASPINATUS TENDON: ICD-10-CM

## 2022-12-28 NOTE — PROGRESS NOTES
Shoulder Post Operative Visit     Assesment:     49 y o  female s/p left shoulder arthroscopy with debridement of rotator cuff, subacromial decompression, biceps tenotomy, closed manipulation under anesthesia, and lysis of adhesions DOS: 12/13/2022    Plan:  The patient's diagnosis and treatment were discussed at length today  We discussed no treatment, non-operative treatment, and operative treatment  The patient's finding and exam are consistent with status post left shoulder arthroscopy with debridement of rotator cuff, subacromial decompression, biceps tenotomy, closed manipulation under anesthesia, and lysis of adhesions performed on 12/13/2022  I discussed with her that during the diagnostic aspect of her arthroscopy her rotator cuff was not torn to the extent that we thought and was able to be treated with a debridement, I mention to her that this does make her recovery and postop a lot shorter  I mentioned to her that she is able to discontinue the use of her sling and begin light gentle passive range of motion  I also discussed that she should begin physical therapy for her shoulder following the subacromial decompression protocol  She is able to progress range of motion as tolerated and will begin strengthening in approximately 4 weeks  She is able to return to driving as tolerated as long as she is off the narcotic medication  She can continue to use over-the-counter medication as ice as needed for pain relief  She is to follow-up in approximately 4 weeks for reevaluation  Post-Operative treatment:    Ice to shoulder 1-2 times daily, for 20 minutes at a time  PT for ROM and strengthening to shoulder, rotator cuff, scapular stabilizers  According to her subacromial decompression and distal clavicle excision protocol      Imaging:    Intraoperative images from left shoulder arthroscopy with department rotator cuff, subacromial decompression, biceps tenotomy, closed manipulation under anesthesia, and lysis of adhesions performed on 12/13/2022 were reviewed with the patient during today's visit  Sling:  Discontinue use of sling  DVT Prophylaxis:  Ambulation, aspirin    Follow up:   4 weeks    Patient was advised that if they have any fevers, chills, chest pain, shortness of breath, redness or drainage from the incision, please let our office know immediately  Chief Complaint   Patient presents with   • Left Shoulder - Post-op       History of Present Illness: The patient is a 49 y o  female who is being evaluated post operatively 2 weeks  status post left shoulder arthroscopy with debridement of rotator cuff, subacromial decompression, biceps tenotomy, close manipulation under anesthesia, and lysis of adhesions performed on 12/13/2022  She states that she is overall doing well at this time and denies any pain  She states that she has been compliant with the use of her sling occasionally wearing it from time to time to allow for gentle passive range of motion  She states she has not yet started formal physical therapy, but intends to following today's visit  She reports that if she does mention pain she is only using over-the-counter medication and ice  She denies any new injury or trauma to her shoulder  She denies any numbness or tingling  She denies any fever or chills  She denies any drainage or leakage from her incision  I have reviewed the past medical, surgical, social and family history, medications and allergies as documented in the EMR  Review of systems: ROS is negative other than that noted in the HPI  Constitutional: Negative for fatigue and fever  Physical Exam:    Blood pressure 133/85, pulse 73, height 5' 5" (1 651 m), weight 126 kg (277 lb)      General/Constitutional: NAD, well developed, well nourished  HENT: Normocephalic, atraumatic  CV: Intact distal pulses, regular rate  Resp: No respiratory distress or labored breathing  Lymphatic: No lymphadenopathy palpated  Neuro: Alert and Oriented x 3, no focal deficits  Psych: Normal mood, normal affect, normal judgement, normal behavior  Skin: Warm, dry, no rashes, no erythema    Shoulder focused exam:        Visual inspection of the shoulder demonstrates normal contour without atrophy  No evidence of scapular dyskinesia or atrophy  No scapular winging  Incisions appear clean and dry with no signs of erythema, leakage, or drainage    Active and passive range of motion demonstrates forward flexion to 90, abduction to 90,  external rotation is 20 with the arm the side   No strength tested today  No special tests performed today    UE NV Exam: +2 Radial pulses bilaterally  Sensation intact to light touch C5-T1 bilaterally, Radial/median/ulnar nerve motor intact      Scribe Attestation    I,:  Niurka Goldman am acting as a scribe while in the presence of the attending physician :       I,:  Braxton Dasilva, DO personally performed the services described in this documentation    as scribed in my presence :

## 2023-01-06 ENCOUNTER — EVALUATION (OUTPATIENT)
Dept: PHYSICAL THERAPY | Facility: CLINIC | Age: 48
End: 2023-01-06

## 2023-01-06 DIAGNOSIS — M75.42 IMPINGEMENT SYNDROME OF LEFT SHOULDER: Primary | ICD-10-CM

## 2023-01-06 DIAGNOSIS — M19.012 PRIMARY OSTEOARTHRITIS OF LEFT SHOULDER: ICD-10-CM

## 2023-01-06 NOTE — PROGRESS NOTES
PT Evaluation     Today's date: 2023  Patient name: Jose Guzman  : 1975  MRN: [id number]  Referring provider: Tayler Lara DO  Dx:   Encounter Diagnosis     ICD-10-CM    1  Impingement syndrome of left shoulder  M75 42 Ambulatory referral to Physical Therapy      2  Primary osteoarthritis of left shoulder  M19 012 Ambulatory referral to Physical Therapy                     Assessment  Assessment details: Jose Guzman is a 49 y o  female presenting to outpatient physical therapy at [place] [address] with complaints of L shoulder pain s/p surgery on 22   They present with decreased range of motion, decreased strength, limited flexibility, poor postural awareness, poor body mechanics, poor balance, decreased tolerance to activity and decreased functional mobility due to Impingement syndrome of left shoulder  (primary encounter diagnosis)  Primary osteoarthritis of left shoulder  Felipe Painter would benefit from skilled physical therapy to address noted impairments in order to allow for full functional return to work and ADL-related activities  Thank you for the referral!  Impairments: abnormal muscle firing, abnormal or restricted ROM, activity intolerance, impaired balance, impaired physical strength, lacks appropriate home exercise program, pain with function and poor body mechanics  Barriers to therapy: n/a  Understanding of Dx/Px/POC: excellent  Goals  ST   Independent with HEP in 2 weeks  2  Decrease pain by 50% in 3 wks  3   Increase AROM flexion to 100 degrees in 3 weeks     LT  Achieve FOTO score of 75/100 in 6 weeks   2   Able to lift and carry 40# box in 6 weeks  3  Strength = 5/5 L shoulder all planes in 6 weeks      Plan  Plan details: RE in 6 weeks    Patient would benefit from: skilled physical therapy  Planned modality interventions: cryotherapy, electrical stimulation/Russian stimulation and thermotherapy: hydrocollator packs  Planned therapy interventions: abdominal trunk stabilization, manual therapy, neuromuscular re-education, therapeutic activities, therapeutic exercise, body mechanics training and home exercise program  Frequency: 1x week  Duration in weeks: 6  Plan of Care beginning date: 1/6/2023  Plan of Care expiration date: 2/17/2023  Treatment plan discussed with: patient        Subjective Evaluation    History of Present Illness  Mechanism of injury: Pt is 3 weeks s/p L sh' arthroscopy with debridement of rotator cuff, subacromial decompression, biceps tenotomy, closed manipulation under anesthesia, and lysis of adhesions performed on 12/13/2022  She has d/c'd sling use and was cleared for light PROM in therapy and driving  Pt reports no pain, only tenderness to touch  Incisions are healed  She has been trying to work on her AROM and it is slowly improving  Denies N/T, neck pain,  weakness  She lives by herself and has been independent with all ADLs, driving, carrying groceries, back to work as a  - all without issue  She would like to work on her ROM and get stronger in order to  hay bails and bags of grain, which weigh 40-70 lbs    Patient Goals  Patient goals for therapy: decreased edema, decreased pain, improved balance, increased motion, return to work, return to [employer] activities, independence with ADLs/IADLs and increased strength          Objective     Observations     Additional Observation Details  Portal incisions with appropriate healing    Tenderness     Additional Tenderness Details  TTP anterior joint line, biceps tendon, supraspin, UT    (-) sulcus  (-) lag    Cervical/Thoracic Screen   Cervical range of motion within normal limits    Active Range of Motion   Left Shoulder   Flexion: 68 degrees   Extension: 46 degrees   Abduction: 48 degrees   External rotation 0°: 62 degrees   External rotation BTH: C2   Internal rotation BTB: T10 WFL    Right Shoulder   Flexion: 162 degrees WFL  Extension: 66 degrees WFL  Abduction: 175 degrees WFL  External rotation 0°: 81 degrees WFL  External rotation BTH: C7   Internal rotation BTB: T10 WFL    Passive Range of Motion   Left Shoulder   Flexion: 180 degrees WFL  Abduction: 120 degrees   External rotation 90°: 90 degrees WFL  Internal rotation 45°: 30 degrees     Strength/Myotome Testing     Left Shoulder     Planes of Motion   Flexion: 4-   Extension: 4   Abduction: 4-   External rotation at 0°: 4-   Internal rotation at 0°: 4     Isolated Muscles   Biceps: 4+   Triceps: 5     Right Shoulder     Planes of Motion   Flexion: 5   Extension: 5   Abduction: 5   External rotation at 0°: 5   Internal rotation at 0°: 5     Isolated Muscles   Biceps: 5   Triceps: 5              Precautions: s/p L sh' arthroscopy with debridement of rotator cuff, SAD, biceps tenotomy, closed manipulation under anesthesia, and lysis of adhesions   DOS: 12/13/22  Other factors:  Pt goals: lift and carry 40-70#      HEP:  Access Code: [email or web address]  URL: [email or web address]  Date: 01/06/2023  Prepared by: Lamont Marts    Exercises  • Standing Single Shoulder Flexion Wall Slide with Palm Up - 15 reps - 5 sec hold  • Standing Shoulder Abduction Slides at Wall - 15 reps - 5 sec hold  • Standing Shoulder Flexion Full Range - 15 reps  • Standing Shoulder Abduction Full Range - 15 reps            Manuals 1/6            L sh' PROM EDUARD            L sh' STM EDUARD                                      Neuro Re-Ed             Prone squeeze             Prone T             Body blade                                                                 Ther Ex             UBE - cardio             Wall slide flex 5"x15            Wall slide abd 5"x15            Pulleys IR             Wand ext             Wand IR up             Wand IR across             Sh' flex arom x15            Sh' abd arom x15            RC iso flex/ext, er/ir, abd/add Ther Activity                                       Gait Training                                       Modalities

## 2023-01-13 ENCOUNTER — OFFICE VISIT (OUTPATIENT)
Dept: PHYSICAL THERAPY | Facility: CLINIC | Age: 48
End: 2023-01-13

## 2023-01-13 DIAGNOSIS — M19.012 PRIMARY OSTEOARTHRITIS OF LEFT SHOULDER: Primary | ICD-10-CM

## 2023-01-13 DIAGNOSIS — M75.42 IMPINGEMENT SYNDROME OF LEFT SHOULDER: ICD-10-CM

## 2023-01-13 NOTE — PROGRESS NOTES
Daily Note     Today's date: 2023  Patient name: Obey Kiser  : 1975  MRN: [id number]  Referring provider: Maria Antonia Lujan DO  Dx:   Encounter Diagnosis     ICD-10-CM    1  Primary osteoarthritis of left shoulder  M19 012       2  Impingement syndrome of left shoulder  M75 42           Start Time: 0745          Subjective: Lateral shoulder is sore, thinks from doing the exercises  Objective: See treatment diary below      Assessment: Trigger points noted throughout the bicep and lateral deltoid  Added RC isometrics for tendon loading and strengthening  Fatigued with these  Ended with gentle independent stretching and MHP  Tolerated treatment well  Patient demonstrated fatigue post treatment and would benefit from continued PT      Plan: Continue per plan of care  Precautions: s/p L sh' arthroscopy with debridement of rotator cuff, SAD, biceps tenotomy, closed manipulation under anesthesia, and lysis of adhesions   DOS: 22  Other factors:  Pt goals: lift and carry 40-70#      HEP:  Access Code: Jonny Dey  URL: [email or web address]  Date: 2023  Prepared by: Hector Hill    Exercises  • Isometric Shoulder Flexion at Wall - 10 reps - 5 sec hold  • Isometric Shoulder Extension at Wall - 10 reps - 5 sec hold  • Standing Isometric Shoulder Internal Rotation at Doorway - 10 reps - 5 sec hold  • Isometric Shoulder External Rotation at Wall - 10 reps - 5 sec hold  • Isometric Shoulder Abduction at Wall - 10 reps - 5 sec hold  • Isometric Shoulder Adduction - 10 reps - 5 sec hold          Manuals            L sh' PROM [address] EDUARD           L sh'  [address]                                     Neuro Re-Ed             Prone squeeze             Prone T             Body blade                                                                 Ther Ex             UBE - cardio  3'/3'           Wall slide flex 5"x15 pillowcase x20           Wall slide abd 5"x15 pillowcase x20 Pulleys IR             Wand ext  x20           Wand IR up  x20           Wand IR across  x20           Sh' flex arom x15            Sh' abd arom x15            RC iso flex/ext, er/ir, abd/add  5"x10                                                                                                      Ther Activity                                       Gait Training                                       Modalities             [facility]  10'

## 2023-01-19 ENCOUNTER — APPOINTMENT (OUTPATIENT)
Dept: PHYSICAL THERAPY | Facility: CLINIC | Age: 48
End: 2023-01-19

## 2023-01-26 ENCOUNTER — OFFICE VISIT (OUTPATIENT)
Dept: OBGYN CLINIC | Facility: CLINIC | Age: 48
End: 2023-01-26

## 2023-01-26 ENCOUNTER — OFFICE VISIT (OUTPATIENT)
Dept: PHYSICAL THERAPY | Facility: CLINIC | Age: 48
End: 2023-01-26

## 2023-01-26 VITALS
HEIGHT: 65 IN | DIASTOLIC BLOOD PRESSURE: 84 MMHG | WEIGHT: 277 LBS | BODY MASS INDEX: 46.15 KG/M2 | HEART RATE: 73 BPM | SYSTOLIC BLOOD PRESSURE: 128 MMHG

## 2023-01-26 DIAGNOSIS — M75.42 IMPINGEMENT SYNDROME OF LEFT SHOULDER: Primary | ICD-10-CM

## 2023-01-26 DIAGNOSIS — M75.42 IMPINGEMENT SYNDROME OF LEFT SHOULDER: ICD-10-CM

## 2023-01-26 DIAGNOSIS — M19.012 PRIMARY OSTEOARTHRITIS OF LEFT SHOULDER: Primary | ICD-10-CM

## 2023-01-26 NOTE — PROGRESS NOTES
Shoulder Post Operative Visit     Assesment:     49 y o  female s/p left shoulder arthroscopy with debridement of rotator cuff, subacromial decompression, biceps tenotomy, closed manipulation under anesthesia, and lysis of adhesions DOS: 12/13/2022, with excellent progression    Plan:  The patient's diagnosis and treatment were discussed at length today  We discussed no treatment, non-operative treatment, and operative treatment  Jenn is doing extremely well today nearly 6 weeks postoperatively following subacromial decompression, manipulation under anesthesia, lysis of adhesions, and biceps tenotomy  She has full passive range of motion is improving her active range of motion  Provided updated physical therapy protocol for her to continue 2 times a week for an additional 6 to 8 weeks  I will see her back in 6 weeks time for 3-month checkup  Post-Operative treatment:    Ice to shoulder 1-2 times daily, for 20 minutes at a time  PT for ROM and strengthening to shoulder, rotator cuff, scapular stabilizers  Home exercise program for shoulder, including ROM and strenthening  Instructions given to patient of what exercises to perform  Imaging:    No imaging was available for review today  Sling:  never, as they have completed this portion of the post op period  DVT Prophylaxis:  Ambulation    Follow up:   6 weeks    Patient was advised that if they have any fevers, chills, chest pain, shortness of breath, redness or drainage from the incision, please let our office know immediately  Chief Complaint   Patient presents with   • Left Shoulder - Post-op       History of Present Illness: The patient is a 49 y o  female who is being evaluated post operatively 6 weeks  status post left shoulder arthroscopy with debridement of rotator cuff, subacromial decompression, biceps tenotomy, closed manipulation under anesthesia, and lysis of adhesions performed on 12/13/2022    She states that she is overall doing well at this time and denies any pain or discomfort  She states that she continues to be compliant with outpatient physical therapy and continues to progress appropriately  She states that her motion and ability to perform activity of daily living has significantly improved  Her only complaint at this time is that with certain movements she does feel like her shoulder is very weak  She denies any new injury or trauma to her shoulder  She denies any numbness or tingling  She is overall happy with the postsurgical outcomes and progression through physical therapy thus far  I have reviewed the past medical, surgical, social and family history, medications and allergies as documented in the EMR  Review of systems: ROS is negative other than that noted in the HPI  Constitutional: Negative for fatigue and fever  Physical Exam:    Blood pressure 128/84, pulse 73, height 5' 5" (1 651 m), weight 126 kg (277 lb)  General/Constitutional: NAD, well developed, well nourished  HENT: Normocephalic, atraumatic  CV: Intact distal pulses, regular rate  Resp: No respiratory distress or labored breathing  Lymphatic: No lymphadenopathy palpated  Neuro: Alert and Oriented x 3, no focal deficits  Psych: Normal mood, normal affect, normal judgement, normal behavior  Skin: Warm, dry, no rashes, no erythema    Shoulder focused exam:        Visual inspection of the shoulder demonstrates normal contour without atrophy  No evidence of scapular dyskinesia or atrophy    No scapular winging  Incisions appear healed  Minimal periincisional tenderness  Active and passive range of motion demonstrates forward flexion to  120 actively and passively 150,  external rotation is 40 with the arm the side, internal rotation to L1             UE NV Exam: +2 Radial pulses bilaterally  Sensation intact to light touch C5-T1 bilaterally, Radial/median/ulnar nerve motor intact      Scribe Attestation    I,:  Estela Hidalgo am acting as a scribe while in the presence of the attending physician :       I,:  Elidia Tomas DO personally performed the services described in this documentation    as scribed in my presence :

## 2023-01-26 NOTE — PROGRESS NOTES
Daily Note     Today's date: 2023  Patient name: Emilia Meraz  : 1975  MRN: [id number]  Referring provider: Catherine See DO  Dx:   Encounter Diagnosis     ICD-10-CM    1  Primary osteoarthritis of left shoulder  M19 012       2  Impingement syndrome of left shoulder  M75 42           Start Time: 1133          Subjective: Shoulder ROM and strength are coming along  Just came from her f/u with surgeon and he was pleased with her progress  Objective: See treatment diary below      Assessment: L shoulder AROM flexion with shrug compensation is approx 150 deg, without shrug, she is able to reach about 85 deg flexion  She also has shrug compensation with abd and ER  She does not demonstrate shrug with shoulder flexion in supine with scap stabilized  Added prone T for mid trap activation, which she was challenged with and made many attempts to compensate with shrug  She was eventually able to perform independently with fair mid trap activation  Able to tolerate progression to resistance training without issue  Tolerated treatment well  Patient demonstrated fatigue post treatment and would benefit from continued PT      Plan: Continue per plan of care  continue with prone scap stabilization TE  Precautions: s/p L sh' arthroscopy with debridement of rotator cuff, SAD, biceps tenotomy, closed manipulation under anesthesia, and lysis of adhesions   DOS: 22  Other factors:  Pt goals: lift and carry 40-70#      HEP:  Access Code: Geraldine Marthadorian  URL: [email or web address]  Date: 2023  Prepared by: Dustin Linton    Exercises  • Isometric Shoulder Flexion at Wall - 10 reps - 5 sec hold  • Isometric Shoulder Extension at Wall - 10 reps - 5 sec hold  • Standing Isometric Shoulder Internal Rotation at Doorway - 10 reps - 5 sec hold  • Isometric Shoulder External Rotation at Wall - 10 reps - 5 sec hold  • Isometric Shoulder Abduction at Wall - 10 reps - 5 sec hold  • Isometric Shoulder Adduction - 10 reps - 5 sec hold          Manuals 1/6 1/13 1/26          L sh' PROM [address] EDUARD          L sh' posterior capsule stretching   EDUARD          L GHJ AP mob   EDUARD          L GHJ inf mob   EDUARD          L sh' STM EDUARD EDUARD                                     Neuro Re-Ed             Prone squeeze             Prone T   2x10          Body blade                                                                 Ther Ex             UBE - cardio  3'/3' 3'/3'          Wall slide flex 5"x15 pillowcase x20           Wall slide abd 5"x15 pillowcase x20           Pulleys IR             Wand ext  x20           Wand IR up  x20           Wand IR across  x20           Sh' flex  x15  0# x20  2# x20          Sh' abd  x15  0# x20  2# x20          RC iso flex/ext, er/ir, abd/add  5"x10           Sh' er/ir   grn x20 ea          Sh' row   blk x20 ea          tband chest press   grn x20 ea          Sh' ext   grn x20 ea                                                 Ther Activity                                       Gait Training                                       Modalities             P  10'

## 2023-02-08 DIAGNOSIS — F41.1 GENERALIZED ANXIETY DISORDER: ICD-10-CM

## 2023-02-08 RX ORDER — CITALOPRAM 40 MG/1
TABLET ORAL
Qty: 90 TABLET | Refills: 0 | Status: SHIPPED | OUTPATIENT
Start: 2023-02-08

## 2023-02-10 ENCOUNTER — EVALUATION (OUTPATIENT)
Dept: PHYSICAL THERAPY | Facility: CLINIC | Age: 48
End: 2023-02-10

## 2023-02-10 DIAGNOSIS — M19.012 PRIMARY OSTEOARTHRITIS OF LEFT SHOULDER: Primary | ICD-10-CM

## 2023-02-10 DIAGNOSIS — M75.42 IMPINGEMENT SYNDROME OF LEFT SHOULDER: ICD-10-CM

## 2023-02-10 NOTE — PROGRESS NOTES
PT Re-Evaluation     Today's date: 2/10/2023  Patient name: Marilynn Molina  : 1975  MRN: [id number]  Referring provider: Cynthia Abdi DO  Dx:   Encounter Diagnosis     ICD-10-CM    1  Primary osteoarthritis of left shoulder  M19 012       2  Impingement syndrome of left shoulder  M75 42                      Assessment  Assessment details: Since starting skilled PT, pain levels are decreased, L shoulder PROM is WFL's, L shoulder AROM is gradually improving with gradual functional progress  Recommend pt continue skilled PT focusing on strengthening ex  Pt was issued an updated HEP  Pt was issued maroon and blue theraband for home use  Impairments: abnormal muscle firing, abnormal or restricted ROM, activity intolerance, impaired balance, impaired physical strength, lacks appropriate home exercise program, pain with function and poor body mechanics  Barriers to therapy: n/a  Understanding of Dx/Px/POC: excellent  Goals  ST   Independent with HEP in 2 weeks-met  2  Decrease pain by 50% in 3 wks- met  3   Increase AROM flexion to 100 degrees in 3 weeks-met     LT  Achieve FOTO score of 75/100 in 6 weeks - not met  2   Able to lift and carry 40# box in 6 weeks- not met  3  Strength = 5/5 L shoulder all planes in 6 weeks- not met      Plan  Plan details: RE in 6 weeks    Patient would benefit from: skilled physical therapy  Planned modality interventions: cryotherapy, electrical stimulation/Russian stimulation and thermotherapy: hydrocollator packs  Planned therapy interventions: abdominal trunk stabilization, manual therapy, neuromuscular re-education, therapeutic activities, therapeutic exercise, body mechanics training and home exercise program  Frequency: 1x week  Duration in weeks: 6  Plan of Care beginning date: 2/10/2023  Plan of Care expiration date: 3/24/2023  Treatment plan discussed with: patient        Subjective Evaluation    History of Present Illness  Mechanism of injury: Pt is 3 weeks s/p L victorino' arthroscopy with debridement of rotator cuff, subacromial decompression, biceps tenotomy, closed manipulation under anesthesia, and lysis of adhesions performed on 2022  She has d/c'd sling use and was cleared for light PROM in therapy and driving  Pt reports no pain, only tenderness to touch  Incisions are healed  She has been trying to work on her AROM and it is slowly improving  Denies N/T, neck pain,  weakness  She lives by herself and has been independent with all ADLs, driving, carrying groceries, back to work as a  - all without issue  She would like to work on her ROM and get stronger in order to  hay bails and bags of grain, which weigh 40-70 lbs  2/10/23: Pt is compliant in HEP  Pt is able to reach overhead with greater ease intermittently  Pt is able to put her hand up on the steering wheel without assistance  Pt feels frustrated that her strength is progressing slowly    Pain  At best pain ratin  At worst pain ratin  Location: shoulder    Patient Goals  Patient goals for therapy: decreased edema, decreased pain, improved balance, increased motion, return to work, return to [employer] activities, independence with ADLs/IADLs and increased strength          Objective     Observations     Additional Observation Details  Portal incisions with appropriate healing    Tenderness     Additional Tenderness Details  TTP anterior joint line, biceps tendon, supraspin, UT    (-) sulcus  (-) lag    Cervical/Thoracic Screen   Cervical range of motion within normal limits    Active Range of Motion   Left Shoulder   Flexion: 100 degrees   Extension: 46 degrees   Abduction: 55 degrees   External rotation 0°: 85 degrees   External rotation BTH: C2   Internal rotation BTB: T10 WFL    Right Shoulder   Flexion: 162 degrees WFL  Extension: 66 degrees WFL  Abduction: 175 degrees WFL  External rotation 0°: 81 degrees WFL  External rotation BTH: C7   Internal rotation BTB: T10 WFL    Passive Range of Motion   Left Shoulder   Flexion: WFL  Abduction: WFL  External rotation 90°: 90 degrees WFL  Internal rotation 45°: 90 degrees     Strength/Myotome Testing     Left Shoulder     Planes of Motion   Flexion: 4-   Extension: 4   Abduction: 4-   External rotation at 0°: 4   Internal rotation at 0°: 4+     Isolated Muscles   Biceps: 4+   Triceps: 5     Right Shoulder     Planes of Motion   Flexion: 5   Extension: 5   Abduction: 5   External rotation at 0°: 5   Internal rotation at 0°: 5     Isolated Muscles   Biceps: 5   Triceps: 5              Precautions: s/p L sh' arthroscopy with debridement of rotator cuff, SAD, biceps tenotomy, closed manipulation under anesthesia, and lysis of adhesions   DOS: 12/13/22  Other factors:  Pt goals: lift and carry 40-70#      HEP:  Access Code: [email or web address]  URL: [email or web address]  Date: 01/06/2023  Prepared by: Corrina Johnson    Exercises  • Standing Single Shoulder Flexion Wall Slide with Palm Up - 15 reps - 5 sec hold  • Standing Shoulder Abduction Slides at Wall - 15 reps - 5 sec hold  • Standing Shoulder Flexion Full Range - 15 reps  • Standing Shoulder Abduction Full Range - 15 reps            Manuals 1/6 1/13 1/26 2/10         L sh' PROM [address] EDUARD th         L sh' posterior capsule stretching   EDUARD          L GHJ AP mob   EDUARD          L GHJ inf mob   EDUARD          L sh' STM [address] EDUARD  th         prog note    th             10'         Neuro Re-Ed             Prone squeeze and hold    10x10"         Prone T   2x10 10         Prone row    10         Prone W    10         Body blade                                       Ther Ex             UBE - cardio  3'/3' 3'/3' 3'/3'         Wall slide flex 5"x15 pillowcase x20           Wall slide abd 5"x15 pillowcase x20           S/l shld ER AROM    2# x15         SA punches supine  x20  2# x15         Supine shld centering  x20  2# x15         S/l shld abd AROM  x20  10         Sh' flex  x15 0# x20  2# x20          Sh' abd  x15  0# x20  2# x20          RC iso flex/ext, er/ir, abd/add  5"x10           Sh' er/ir   grn x20 ea BTB x20         Sh' row   blk x20 ea blk x20         tband chest press   grn x20 ea          Sh' ext   grn x20 ea BTBx20         S/l shld flexion AROM    10         Supine horz abd     15 GTB         HEP update    5'         Ther Activity                                       Gait Training                                       Modalities             P  10'

## 2023-02-17 ENCOUNTER — OFFICE VISIT (OUTPATIENT)
Dept: PHYSICAL THERAPY | Facility: CLINIC | Age: 48
End: 2023-02-17

## 2023-02-17 DIAGNOSIS — M19.012 PRIMARY OSTEOARTHRITIS OF LEFT SHOULDER: Primary | ICD-10-CM

## 2023-02-17 DIAGNOSIS — M75.42 IMPINGEMENT SYNDROME OF LEFT SHOULDER: ICD-10-CM

## 2023-02-17 NOTE — PROGRESS NOTES
Daily Note     Today's date: 2023  Patient name: Finesse Muse  : 1975  MRN: [id number]  Referring provider: Demetrius Nugent DO  Dx:   Encounter Diagnosis     ICD-10-CM    1  Primary osteoarthritis of left shoulder  M19 012       2  Impingement syndrome of left shoulder  M75 42                      Subjective: Patient had a long work wk, little time for HEP  Her shoulder is feeling stiff and sore this AM       Objective: See treatment diary below      Assessment: Tolerated treatment well  Patient UBE followed by PROM had good results for reducing stiffness  Standing shoulder PREs were challenging, S/L with less weight were better tolerated  Patient fatigues quickly with prone periscapular strengthening, compensatory patters noted  Continued PT would be beneficial to improve function           Plan: Continue per plan of care  Precautions: s/p L sh' arthroscopy with debridement of rotator cuff, SAD, biceps tenotomy, closed manipulation under anesthesia, and lysis of adhesions   DOS: 22  Other factors:  Pt goals: lift and carry 40-70#      HEP:  Access Code: [email or web address]  URL: [email or web address]  Date: 2023  Prepared by: Mitul Reining    Exercises  • Standing Single Shoulder Flexion Wall Slide with Palm Up - 15 reps - 5 sec hold  • Standing Shoulder Abduction Slides at Wall - 15 reps - 5 sec hold  • Standing Shoulder Flexion Full Range - 15 reps  • Standing Shoulder Abduction Full Range - 15 reps            Manuals 1/6 1/13 1/26 2/10 2/17        L sh' PROM EDUARD EDUARD EDUARD th MB        L sh' posterior capsule stretching   EDUARD          L GHJ AP mob   EDUARD          L GHJ inf mob   EDUARD          L sh' STM [address] EDUARD  th         prog note    th             10'         Neuro Re-Ed             Prone squeeze and hold    10x10" 10x10"        Prone T   2x10 10 15x        Prone row    10 15x 1#        Prone W    10 15x        Body blade                                       Ther Ex             UBE - cardio  3'/3' 3'/3' 3'/3' 3'/3'        Wall slide flex 5"x15 pillowcase x20           Wall slide abd 5"x15 pillowcase x20           S/l shld ER AROM    2# x15 1# 2x10        SA punches supine  x20  2# x15 2# 2x10        Supine shld centering  x20  2# x15 2# x15 A/P,M/L        S/l shld abd AROM  x20  10 20x 1#        Sh' flex  x15  0# x20  2# x20          Sh' abd  x15  0# x20  2# x20          RC iso flex/ext, er/ir, abd/add  5"x10           Sh' er/ir   grn x20 ea BTB x20 BTB x20        Sh' row   blk x20 ea blk x20 blk x20        tband chest press   grn x20 ea          Sh' ext   grn x20 ea BTBx20 blk x20        S/l shld flexion AROM    10 20x 1#        Supine horz abd     15 GTB 15x 2 way GTB        HEP update    5'         Ther Activity                                       Gait Training                                       Modalities             [facility]  10'

## 2023-02-23 ENCOUNTER — OFFICE VISIT (OUTPATIENT)
Dept: OBGYN CLINIC | Facility: CLINIC | Age: 48
End: 2023-02-23

## 2023-02-23 ENCOUNTER — APPOINTMENT (OUTPATIENT)
Dept: PHYSICAL THERAPY | Facility: CLINIC | Age: 48
End: 2023-02-23

## 2023-02-23 VITALS
SYSTOLIC BLOOD PRESSURE: 126 MMHG | HEIGHT: 65 IN | WEIGHT: 277 LBS | DIASTOLIC BLOOD PRESSURE: 84 MMHG | BODY MASS INDEX: 46.15 KG/M2

## 2023-02-23 DIAGNOSIS — Z98.890 STATUS POST ARTHROSCOPY OF LEFT SHOULDER: Primary | ICD-10-CM

## 2023-02-23 NOTE — PROGRESS NOTES
Shoulder Post Operative Visit     Assesment:      49 y o  female 10 weeks s/p left shoulder arthroscopy with debridement of rotator cuff, subacromial decompression, biceps tenotomy, closed manipulation under anesthesia, and lysis of adhesions DOS: 12/13/2022, with excellent progression     Plan:  The patient's diagnosis and treatment were discussed at length today  We discussed no treatment, non-operative treatment, and operative treatment      Jenn is doing extremely well today 10 weeks postoperatively following subacromial decompression, manipulation under anesthesia, lysis of adhesions, and biceps tenotomy  She has full passive range of motion is improving her active range of motion  She will benefit from continued Physical Therapy with a focus on strengthening the shoulder and maintaining full range of motion  She may continue recreational and work activities as tolerated  I will see her back in 6 weeks for re-evaluation of the left shoulder       Post-Operative treatment:     Ice to shoulder 1-2 times daily, for 20 minutes at a time  PT for ROM and strengthening to shoulder, rotator cuff, scapular stabilizers  May continue to work on strengthening the shoulder     May continue activity as tolerated      Imaging:     No imaging was available for review today      Sling:  never, as they have completed this portion of the post op period      DVT Prophylaxis:  Ambulation     Follow up:   6 weeks     Patient was advised that if they have any fevers, chills, chest pain, shortness of breath, redness or drainage from the incision, please let our office know immediately               Chief Complaint   Patient presents with   • Left Shoulder - Post-op         History of Present Illness:     The patient is a 49 y o  female who is being evaluated post operatively 6 weeks  status post left shoulder arthroscopy with debridement of rotator cuff, subacromial decompression, biceps tenotomy, closed manipulation under anesthesia, and lysis of adhesions performed on 12/13/2022  She states that she is overall doing well at this time and denies any pain or discomfort  She states that she continues to be compliant with outpatient physical therapy and continues to progress appropriately  She states that her motion and ability to perform activity of daily living has significantly improved  She denies any new injury or trauma to her shoulder  She denies any numbness or tingling, secondary traumas, fevers, chills  She is overall happy with the postsurgical outcomes and progression through physical therapy thus far  She has returned to work full time with no limitations       I have reviewed the past medical, surgical, social and family history, medications and allergies as documented in the EMR  Review of systems: ROS is negative other than that noted in the HPI  Constitutional: Negative for fatigue and fever          Physical Exam:     Blood pressure 128/84, pulse 73, height 5' 5" (1 651 m), weight 126 kg (277 lb)       General/Constitutional: NAD, well developed, well nourished  HENT: Normocephalic, atraumatic  CV: Intact distal pulses, regular rate  Resp: No respiratory distress or labored breathing  Lymphatic: No lymphadenopathy palpated  Neuro: Alert and Oriented x 3, no focal deficits  Psych: Normal mood, normal affect, normal judgement, normal behavior  Skin: Warm, dry, no rashes, no erythema     Shoulder focused exam:        Visual inspection of the shoulder demonstrates normal contour without atrophy  No evidence of scapular dyskinesia or atrophy   No scapular winging  Incisions appear healed  Active and passive range of motion demonstrates forward flexion to  130 actively and passively 150,  external rotation is 50 with the arm the side, internal rotation to L1  4/5 strength with external rotation 4/5 flexion         UE NV Exam: +2 Radial pulses bilaterally  Sensation intact to light touch C5-T1 bilaterally, Radial/median/ulnar nerve motor intact    Scribe Attestation    I,:  Cecy Tsai am acting as a scribe while in the presence of the attending physician :       I,:  Avelino Croft, DO personally performed the services described in this documentation    as scribed in my presence :

## 2023-03-02 ENCOUNTER — OFFICE VISIT (OUTPATIENT)
Dept: PHYSICAL THERAPY | Facility: CLINIC | Age: 48
End: 2023-03-02

## 2023-03-02 DIAGNOSIS — M19.012 PRIMARY OSTEOARTHRITIS OF LEFT SHOULDER: Primary | ICD-10-CM

## 2023-03-02 NOTE — PROGRESS NOTES
Daily Note     Today's date: 3/2/2023  Patient name: Mimi Cordoba  : 1975  MRN: [id number]  Referring provider: Merlin Ray DO  Dx:   Encounter Diagnosis     ICD-10-CM    1  Primary osteoarthritis of left shoulder  M19 012                      Subjective: Patient reports shoulder feels stiff this morning  Objective: See treatment diary below      Assessment: Tolerated treatment well  Patient UBE followed by PROM had good results for reducing stiffness  Patient demonstrates good tolerance with strengthening  Does fatigue but not increase in pain  Patient demonstrates full ROM of left shoulder  Continued PT would be beneficial to improve function           Plan: Continue per plan of care  Precautions: s/p L sh' arthroscopy with debridement of rotator cuff, SAD, biceps tenotomy, closed manipulation under anesthesia, and lysis of adhesions   DOS: 22  Other factors:  Pt goals: lift and carry 40-70#      HEP:  Access Code: [email or web address]  URL: [email or web address]  Date: 2023  Prepared by: Kalina Median    Exercises  • Standing Single Shoulder Flexion Wall Slide with Palm Up - 15 reps - 5 sec hold  • Standing Shoulder Abduction Slides at Wall - 15 reps - 5 sec hold  • Standing Shoulder Flexion Full Range - 15 reps  • Standing Shoulder Abduction Full Range - 15 reps            Manuals 1/6 1/13 1/26 2/10 2/17 3/2       L sh' PROM [address] EDUARD th MB ksg       L sh' posterior capsule stretching   EDUARD          L GHJ AP mob   EDUARD          L GHJ inf mob   EDUARD          L sh' STM [address] [facility]         prog note    th             10'         Neuro Re-Ed             Prone squeeze and hold    10x10" 10x10" 10/10"       Prone T   2x10 10 15x 15x       Prone row    10 15x 1# 15x 1#       Prone W    10 15x 15x       Body blade                                       Ther Ex             UBE - cardio  3'/3' 3'/3' 3'/3' 3'/3' 3'/3'       Wall slide flex 5"x15 pillowcase x20           Wall slide abd 5"x15 pillowcase x20           S/l shld ER AROM    2# x15 1# 2x10 1# 2x10       SA punches supine  x20  2# x15 2# 2x10 2#  2x10       Supine shld centering  x20  2# x15 2# x15 A/P,M/L 2#x15 A/P  M/L       S/l shld abd AROM  x20  10 20x 1# 20x 1#       Sh' flex  x15  0# x20  2# x20          Sh' abd  x15  0# x20  2# x20          RC iso flex/ext, er/ir, abd/add  5"x10           Sh' er/ir   grn x20 ea BTB x20 BTB x20 BTB x20       Sh' row   blk x20 ea blk x20 blk x20 BLK x20       tband chest press   grn x20 ea          Sh' ext   grn x20 ea BTBx20 blk x20 BLk x20       S/l shld flexion AROM    10 20x 1# 20x 1#       Supine horz abd     15 GTB 15x 2 way GTB 15X bilateral GTB       HEP update    5'         Ther Activity                                       Gait Training                                       Modalities             MHP  10'

## 2023-03-17 ENCOUNTER — HOSPITAL ENCOUNTER (OUTPATIENT)
Dept: MAMMOGRAPHY | Facility: CLINIC | Age: 48
Discharge: HOME | End: 2023-03-17

## 2023-03-17 VITALS — BODY MASS INDEX: 46.28 KG/M2 | HEIGHT: 65 IN | WEIGHT: 277.78 LBS

## 2023-03-17 DIAGNOSIS — R92.8 ABNORMAL MAMMOGRAM: ICD-10-CM

## 2023-04-27 ENCOUNTER — TELEPHONE (OUTPATIENT)
Dept: OTHER | Facility: OTHER | Age: 48
End: 2023-04-27

## 2023-05-11 ENCOUNTER — TELEPHONE (OUTPATIENT)
Dept: FAMILY MEDICINE CLINIC | Facility: HOSPITAL | Age: 48
End: 2023-05-11

## 2023-05-11 DIAGNOSIS — F41.1 GENERALIZED ANXIETY DISORDER: ICD-10-CM

## 2023-05-11 DIAGNOSIS — Z13.6 SCREENING FOR CARDIOVASCULAR CONDITION: ICD-10-CM

## 2023-05-11 DIAGNOSIS — Z13.1 SCREENING FOR DIABETES MELLITUS (DM): Primary | ICD-10-CM

## 2023-05-11 RX ORDER — CITALOPRAM 40 MG/1
40 TABLET ORAL DAILY
Qty: 90 TABLET | Refills: 0 | Status: CANCELLED | OUTPATIENT
Start: 2023-05-11

## 2023-05-12 DIAGNOSIS — F41.1 GENERALIZED ANXIETY DISORDER: ICD-10-CM

## 2023-05-12 RX ORDER — CITALOPRAM 40 MG/1
40 TABLET ORAL DAILY
Qty: 90 TABLET | Refills: 0 | Status: SHIPPED | OUTPATIENT
Start: 2023-05-12

## 2023-05-18 ENCOUNTER — ANNUAL EXAM (OUTPATIENT)
Dept: FAMILY MEDICINE CLINIC | Facility: HOSPITAL | Age: 48
End: 2023-05-18

## 2023-05-18 VITALS
SYSTOLIC BLOOD PRESSURE: 124 MMHG | DIASTOLIC BLOOD PRESSURE: 68 MMHG | HEIGHT: 65 IN | WEIGHT: 284 LBS | HEART RATE: 70 BPM | BODY MASS INDEX: 47.32 KG/M2 | TEMPERATURE: 98.8 F

## 2023-05-18 DIAGNOSIS — Z12.11 SCREENING FOR MALIGNANT NEOPLASM OF COLON: ICD-10-CM

## 2023-05-18 DIAGNOSIS — Z12.4 SCREENING FOR CERVICAL CANCER: ICD-10-CM

## 2023-05-18 DIAGNOSIS — Z01.419 ENCOUNTER FOR GYNECOLOGICAL EXAMINATION WITHOUT ABNORMAL FINDING: Primary | ICD-10-CM

## 2023-05-18 LAB — SL AMB POCT FECES OCC BLD: NEGATIVE

## 2023-05-18 NOTE — PROGRESS NOTES
Assessment     Healthy female exam       Plan    Pap smear collected, CBE/pelvic exam updated  Schedule screening colonoscopy as ordered      Education reviewed: self breast exams  Follow up in: 1 year  Joshua Guan is a 49 y o  female who presents for routine gyn exam  Periods are regular every 28-30 days, lasting 6 days  Dysmenorrhea:moderate, occurring first 1-2 days of flow  Cyclic symptoms include breast tenderness  No intermenstrual bleeding, spotting, or discharge  Last gyn exam/pap smear -  w/Dr Flynn  Hx  x2 (28 & 30 years ago)    Current contraception: abstinence  History of abnormal Pap smear: no  Family history of uterine or ovarian cancer: yes - maternal grandmom  History of abnormal mammogram: yes - , recheck in  was normal  Family history of breast cancer: no    Menstrual History:  OB History        2    Para   2    Term   2            AB        Living           SAB        IAB        Ectopic        Multiple        Live Births                    Patient's last menstrual period was 2023  The following portions of the patient's history were reviewed and updated as appropriate: allergies, current medications, past family history, past medical history, past social history, past surgical history and problem list    Talita Burgess is a 49 y o  female here for a routine exam        Gynecologic History  Patient's last menstrual period was 2023  Contraception: abstinence - not currently sexually active  Last Pap: 2018  Results were: normal  Last mammogram: 3/2023   Results were: normal    Obstetric History  OB History    Para Term  AB Living   2 2 2         SAB IAB Ectopic Multiple Live Births                  # Outcome Date GA Lbr Man/2nd Weight Sex Delivery Anes PTL Lv   2 Term            1 Term                The following portions of the patient's history were reviewed and updated as appropriate: allergies, current medications, past family history, past medical history, past social history, past surgical history and problem list     Review of Systems  Pertinent items are noted in HPI       Social History     Tobacco Use   • Smoking status: Never   • Smokeless tobacco: Never   Vaping Use   • Vaping Use: Never used   Substance Use Topics   • Alcohol use: Not Currently     Comment: Socially   • Drug use: Never     Family History   Problem Relation Age of Onset   • No Known Problems Mother    • No Known Problems Father    • Ovarian cysts Sister    • No Known Problems Sister    • No Known Problems Daughter    • Ovarian cancer Maternal Grandmother         63's   • No Known Problems Maternal Grandfather    • No Known Problems Paternal Grandmother    • No Known Problems Paternal Grandfather    • No Known Problems Maternal Aunt    • No Known Problems Maternal Aunt    • No Known Problems Maternal Aunt    • No Known Problems Maternal Aunt      Past Surgical History:   Procedure Laterality Date   •  SECTION      x2   • AL ARTHRS KNEE W/MENISCECTOMY MED&LAT W/SHAVING Right 2022    Procedure: MENISCECTOMY LATERAL /MEDIAL-arthroscopy;  Surgeon: Rasheed Benavides DO;  Location: [facility];  Service: Orthopedics   • AL SURGICAL ARTHROSCOPY R[place]MOND W/CORACOACRM LIGM RLS Left 2022    Procedure: ARTHROSCOPIC SUBACROMIAL DECOMPRESSION;  Surgeon: Rob Pepe DO;  Location: [facility];  Service: Orthopedics   • AL SURGICAL ARTHROSCOPY SHOULDER W/ROTATOR CUFF RPR Left 2022    Procedure: REPAIR ROTATOR CUFF  ARTHROSCOPIC;  Surgeon: Rob Pepe DO;  Location:  MAIN OR;  Service: Orthopedics     Current Outpatient Medications   Medication Sig Dispense Refill   • citalopram (CeleXA) 40 mg tablet Take 1 tablet (40 mg total) by mouth daily 90 tablet 0   • aspirin (ECOTRIN LOW STRENGTH) 81 mg EC tablet Take 1 tablet (81 mg total) by mouth 2 (two) times a day (Patient not taking: Reported on 2023) 60 tablet 0     No "current facility-administered medications for this visit  Objective:    Vitals:    05/18/23 1109   BP: 124/68   Pulse: 70   Temp: 98 8 °F (37 1 °C)       Physical Exam  Vitals reviewed  Exam conducted with a chaperone present  Chest:   Breasts:     Right: Normal       Left: Normal    Musculoskeletal:      Right lower leg: No edema  Left lower leg: No edema  Lymphadenopathy:      Upper Body:      Right upper body: No axillary adenopathy  Left upper body: No axillary adenopathy  Skin:     General: Skin is warm and dry  Neurological:      General: No focal deficit present  Mental Status: She is oriented to person, place, and time     Psychiatric:         Mood and Affect: Mood normal          Behavior: Behavior normal        Objective      /68   Pulse 70   Temp 98 8 °F (37 1 °C)   Ht 5' 5\" (1 651 m)   Wt 129 kg (284 lb)   LMP 04/08/2023   BMI 47 26 kg/m²     General:   alert and oriented, in no acute distress, appears stated age and cooperative   Heart: regular rate and rhythm, S1, S2 normal, no murmur, click, rub or gallop   Lungs: clear to auscultation bilaterally   Abdomen: soft, non-tender, without masses or organomegaly   Vulva: normal   Vagina: normal mucosa, mod amt thin white discharge   Cervix: no bleeding following Pap, no cervical motion tenderness, no lesions and nulliparous appearance   Uterus: normal size, mobile, non-tender   Adnexa: no mass, fullness, tenderness       "

## 2023-05-23 LAB
CYTOLOGIST CVX/VAG CYTO: NORMAL
DX ICD CODE: NORMAL
HPV I/H RISK 4 DNA CVX QL PROBE+SIG AMP: NEGATIVE
OTHER STN SPEC: NORMAL
PATH REPORT.FINAL DX SPEC: NORMAL
SL AMB NOTE:: NORMAL
SL AMB SPECIMEN ADEQUACY: NORMAL
SL AMB TEST METHODOLOGY: NORMAL

## 2023-10-13 ENCOUNTER — TELEPHONE (OUTPATIENT)
Dept: FAMILY MEDICINE CLINIC | Facility: HOSPITAL | Age: 48
End: 2023-10-13

## 2023-10-13 NOTE — TELEPHONE ENCOUNTER
Pt asking if iron can be added to labs ordered 5/18. Will be going to get these done next week. Went to donate blood and was unable to due to low iron.

## 2023-10-16 DIAGNOSIS — Z13.6 SCREENING FOR CARDIOVASCULAR CONDITION: ICD-10-CM

## 2023-10-16 DIAGNOSIS — E61.1 IRON DEFICIENCY: Primary | ICD-10-CM

## 2023-10-16 DIAGNOSIS — F41.1 GENERALIZED ANXIETY DISORDER: ICD-10-CM

## 2023-10-20 ENCOUNTER — TELEPHONE (OUTPATIENT)
Age: 48
End: 2023-10-20

## 2023-10-20 ENCOUNTER — CONSULT (OUTPATIENT)
Age: 48
End: 2023-10-20
Payer: COMMERCIAL

## 2023-10-20 VITALS
BODY MASS INDEX: 47.15 KG/M2 | WEIGHT: 283 LBS | DIASTOLIC BLOOD PRESSURE: 74 MMHG | SYSTOLIC BLOOD PRESSURE: 116 MMHG | HEIGHT: 65 IN

## 2023-10-20 DIAGNOSIS — R19.7 DIARRHEA, UNSPECIFIED TYPE: ICD-10-CM

## 2023-10-20 DIAGNOSIS — R19.8 IRREGULAR BOWEL HABITS: Primary | ICD-10-CM

## 2023-10-20 DIAGNOSIS — Z12.11 SCREENING FOR MALIGNANT NEOPLASM OF COLON: ICD-10-CM

## 2023-10-20 PROCEDURE — 99203 OFFICE O/P NEW LOW 30 MIN: CPT | Performed by: NURSE PRACTITIONER

## 2023-10-20 NOTE — PROGRESS NOTES
[facility] - Outpatient Consultation  Jenn Copeland Said 51 y.o. female MRN: [id number]  Encounter: [id number]    ASSESSMENT AND PLAN:      1. Screening for malignant neoplasm of colon  Patient presents to schedule initial screening colonoscopy. Average risk for CRC, denies family history of colon cancer  -Scheduled for colonoscopy at [facility]    2. Irregular bowel habits  Patient reports loose to watery bowel movements 1-2x/day chronically for many years. Denies recent acute change in her bowel habits. Not disruptive to her lifestyle or activities  Denies associated abdominal cramping/pain, no melena or rectal bleeding  No alarm symptoms  Unable to identify food triggers but denies lactose or wheat intolerance  Has well water at her home but drinks bottled water only  -Check stool samples including Giardia, ova and parasite, enteric bacterial panel and pancreatic elastase to rule out infectious diarrhea  -Routine labs ordered by PCP including CBC, CMP, TSH  -Scheduled for colonoscopy as above with colonic biopsy to evaluate for microscopic colitis  -If stool cultures negative, recommend starting daily fiber supplement such as Benefiber      Followup Appointment: As needed  ______________________________________________________________________    Chief Complaint   Patient presents with    Diarrhea     Pt has been experiencing diarrhea almost on a daily basis for years despite diet adjustments. No blood seen in her stool. Pt would like to schedule screening colonoscopy. HPI:   Zahraa Moore is a 51y.o. year old female who presents to schedule initial screening colonoscopy. Denies family history of colon cancer    She reports that she chronically has loose to watery bowel movements 1-2 times per day which has been her bowel pattern for many years  Rarely has formed stool.   Denies associated abdominal cramping or pain  Unable to identify food triggers, denies lactose or wheat intolerance  Denies melena or rectal bleeding  She does have well water at her home but uses bottled water. Denies recent travel    Her appetite has been good and her weight has been stable.   Denies GERD symptoms    She is scheduled to have routine blood work per her PCP tomorrow including CBC, CMP and TSH    Historical Information   Past Medical History:   Diagnosis Date    Acute pain of right knee 2018    Anxiety     Dental disorder     last assessed: Dec 4, 2014    Effusion of right knee 2019    Generalized anxiety disorder 2018     Past Surgical History:   Procedure Laterality Date     SECTION      x2    NE ARTHRS KNEE W/MENISCECTOMY MED&LAT W/SHAVING Right 2022    Procedure: MENISCECTOMY LATERAL /MEDIAL-arthroscopy;  Surgeon: Hiren Rebolledo DO;  Location: [facility];  Service: Orthopedics    NE SURGICAL ARTHROSCOPY R[place] W/CORACOACRM LIGM RLS Left 2022    Procedure: ARTHROSCOPIC SUBACROMIAL DECOMPRESSION;  Surgeon: Alyssa Sheikh DO;  Location: [facility];  Service: Orthopedics    NE SURGICAL ARTHROSCOPY SHOULDER W/ROTATOR CUFF RPR Left 2022    Procedure: REPAIR ROTATOR CUFF  ARTHROSCOPIC;  Surgeon: Alyssa Sheikh DO;  Location: [facility];  Service: Orthopedics     Social History     Substance and Sexual Activity   Alcohol Use Not Currently    Comment: Socially     Social History     Substance and Sexual Activity   Drug Use Never     Social History     Tobacco Use   Smoking Status Never    Passive exposure: Never   Smokeless Tobacco Never     Family History   Problem Relation Age of Onset    No Known Problems Mother     No Known Problems Father     Ovarian cysts Sister     No Known Problems Sister     No Known Problems Daughter     Ovarian cancer Maternal Grandmother         60's    No Known Problems Maternal Grandfather     No Known Problems Paternal Grandmother     No Known Problems Paternal Grandfather     No Known Problems Maternal Aunt     No Known Problems Maternal Aunt     No Known Problems Maternal Aunt     No Known Problems Maternal Aunt        Meds/Allergies     Current Outpatient Medications:     citalopram (CeleXA) 40 mg tablet    aspirin (ECOTRIN LOW STRENGTH) 81 mg EC tablet    No Known Allergies    PHYSICAL EXAM:    Blood pressure 116/74, height 5' 5" (1.651 m), weight 128 kg (283 lb). Body mass index is 47.09 kg/m². General Appearance: NAD, cooperative, alert  Eyes: Anicteric  ENT:  Normocephalic, atraumatic, normal mucosa. Neck:  Supple, symmetrical, trachea midline,   Resp:  Clear to auscultation bilaterally; no rales, rhonchi or wheezing; respirations unlabored   CV:  S1 S2, Regular rate and rhythm; no murmur, rub, or gallop. GI:  Soft, non-tender, non-distended; normal bowel sounds; no masses, no organomegaly   Rectal: Deferred  Musculoskeletal: No cyanosis, clubbing or edema. Normal ROM. Skin:  No jaundice, rashes, or lesions   Psych: Normal affect, good eye contact  Neuro: No gross deficits, AAOx3    Lab Results:   Lab Results   Component Value Date    WBC 6.4 03/31/2022    HGB 11.8 03/31/2022    HCT 35.9 03/31/2022    MCV 87 03/31/2022     03/31/2022     Lab Results   Component Value Date    K 4.8 03/31/2022     03/31/2022    CO2 20 03/31/2022    BUN 12 03/31/2022    CREATININE 0.91 03/31/2022    CALCIUM 9.4 09/13/2018    AST 11 03/31/2022    ALT 9 03/31/2022    ALKPHOS 52 09/13/2018    EGFR 78 03/31/2022     No results found for: "IRON", "TIBC", "FERRITIN"  No results found for: "LIPASE"    Radiology Results:   No results found. REVIEW OF SYSTEMS:    CONSTITUTIONAL: Denies any fever, chills, rigors, and weight loss. HEENT: No earache or tinnitus. Denies hearing loss or visual disturbances. CARDIOVASCULAR: No chest pain or palpitations. RESPIRATORY: Denies any cough, hemoptysis, shortness of breath or dyspnea on exertion. GASTROINTESTINAL: As noted in the History of Present Illness.    GENITOURINARY: No problems with urination. Denies any hematuria or dysuria. NEUROLOGIC: No dizziness or vertigo, denies headaches. MUSCULOSKELETAL: Denies any muscle or joint pain. SKIN: Denies skin rashes or itching. ENDOCRINE: Denies excessive thirst. Denies intolerance to heat or cold. PSYCHOSOCIAL: Denies depression or anxiety. Denies any recent memory loss.

## 2023-10-20 NOTE — TELEPHONE ENCOUNTER
Scheduled date of colonoscopy (as of today):12/15/23  Physician performing colonoscopy:  Location of colonoscopy:[facility]  Bowel prep reviewed with patient:Miralax/Dulcolax  Instructions reviewed with patient by:SINDY  Clearances: N

## 2023-11-04 LAB
ALBUMIN SERPL-MCNC: 3.9 G/DL (ref 3.9–4.9)
ALBUMIN/GLOB SERPL: 1.7 {RATIO} (ref 1.2–2.2)
ALP SERPL-CCNC: 69 IU/L (ref 44–121)
ALT SERPL-CCNC: 12 IU/L (ref 0–32)
AST SERPL-CCNC: 16 IU/L (ref 0–40)
BILIRUB SERPL-MCNC: 0.3 MG/DL (ref 0–1.2)
BUN SERPL-MCNC: 9 MG/DL (ref 6–24)
BUN/CREAT SERPL: 12 (ref 9–23)
CALCIUM SERPL-MCNC: 9 MG/DL (ref 8.7–10.2)
CHLORIDE SERPL-SCNC: 104 MMOL/L (ref 96–106)
CHOLEST SERPL-MCNC: 221 MG/DL (ref 100–199)
CO2 SERPL-SCNC: 26 MMOL/L (ref 20–29)
CREAT SERPL-MCNC: 0.77 MG/DL (ref 0.57–1)
EGFR: 95 ML/MIN/1.73
ERYTHROCYTE [DISTWIDTH] IN BLOOD BY AUTOMATED COUNT: 13.1 % (ref 11.7–15.4)
FERRITIN SERPL-MCNC: 48 NG/ML (ref 15–150)
GLOBULIN SER-MCNC: 2.3 G/DL (ref 1.5–4.5)
GLUCOSE SERPL-MCNC: 108 MG/DL (ref 70–99)
HCT VFR BLD AUTO: 34.8 % (ref 34–46.6)
HDLC SERPL-MCNC: 41 MG/DL
HGB BLD-MCNC: 11.4 G/DL (ref 11.1–15.9)
IRON SERPL-MCNC: 57 UG/DL (ref 27–159)
LDLC SERPL CALC-MCNC: 146 MG/DL (ref 0–99)
LDLC/HDLC SERPL: 3.6 RATIO (ref 0–3.2)
MCH RBC QN AUTO: 28.7 PG (ref 26.6–33)
MCHC RBC AUTO-ENTMCNC: 32.8 G/DL (ref 31.5–35.7)
MCV RBC AUTO: 88 FL (ref 79–97)
PLATELET # BLD AUTO: 308 X10E3/UL (ref 150–450)
POTASSIUM SERPL-SCNC: 4.8 MMOL/L (ref 3.5–5.2)
PROT SERPL-MCNC: 6.2 G/DL (ref 6–8.5)
RBC # BLD AUTO: 3.97 X10E6/UL (ref 3.77–5.28)
SL AMB VLDL CHOLESTEROL CALC: 34 MG/DL (ref 5–40)
SODIUM SERPL-SCNC: 141 MMOL/L (ref 134–144)
TRIGL SERPL-MCNC: 186 MG/DL (ref 0–149)
TSH SERPL DL<=0.005 MIU/L-ACNC: 1.23 UIU/ML (ref 0.45–4.5)
WBC # BLD AUTO: 6.6 X10E3/UL (ref 3.4–10.8)

## 2023-11-06 DIAGNOSIS — F41.1 GENERALIZED ANXIETY DISORDER: ICD-10-CM

## 2023-11-06 RX ORDER — CITALOPRAM 40 MG/1
40 TABLET ORAL DAILY
Qty: 90 TABLET | Refills: 0 | Status: SHIPPED | OUTPATIENT
Start: 2023-11-06

## 2023-11-22 ENCOUNTER — OFFICE VISIT (OUTPATIENT)
Dept: FAMILY MEDICINE CLINIC | Facility: HOSPITAL | Age: 48
End: 2023-11-22
Payer: COMMERCIAL

## 2023-11-22 VITALS
WEIGHT: 284.4 LBS | DIASTOLIC BLOOD PRESSURE: 84 MMHG | HEART RATE: 77 BPM | SYSTOLIC BLOOD PRESSURE: 120 MMHG | HEIGHT: 65 IN | TEMPERATURE: 98.6 F | BODY MASS INDEX: 47.38 KG/M2

## 2023-11-22 DIAGNOSIS — R73.03 PREDIABETES: ICD-10-CM

## 2023-11-22 DIAGNOSIS — F41.9 ANXIETY: ICD-10-CM

## 2023-11-22 DIAGNOSIS — F41.1 GENERALIZED ANXIETY DISORDER: Primary | ICD-10-CM

## 2023-11-22 DIAGNOSIS — E78.5 HYPERLIPIDEMIA, UNSPECIFIED HYPERLIPIDEMIA TYPE: ICD-10-CM

## 2023-11-22 PROCEDURE — 99214 OFFICE O/P EST MOD 30 MIN: CPT | Performed by: FAMILY MEDICINE

## 2023-11-22 RX ORDER — CITALOPRAM 40 MG/1
40 TABLET ORAL DAILY
Qty: 90 TABLET | Refills: 1 | Status: SHIPPED | OUTPATIENT
Start: 2023-11-22

## 2023-11-22 NOTE — PROGRESS NOTES
Name: Karolyn Rome      :       MRN: [id number]  Encounter Provider: Rosanne Butler MD  Encounter Date: 2023   Encounter department: [address]     1. Generalized anxiety disorder  -     citalopram (CeleXA) 40 mg tablet; Take 1 tablet (40 mg total) by mouth daily     Doing well with celexa. Continue with the same. 2. Anxiety    3. Prediabetes    Work on dietary control and exercise. Reviewed last labs. 4. Hyperlipidemia, unspecified hyperlipidemia type  Work on dietary control and exercise   The 10-year ASCVD risk score (Isaak SANTIAGO, et al., 2019) is: 1.7%    Values used to calculate the score:      Age: 51 years      Sex: Female      Is Non- : No      Diabetic: No      Tobacco smoker: No      Systolic Blood Pressure: 025 mmHg      Is BP treated: No      HDL Cholesterol: 41 mg/dL      Total Cholesterol: 221 mg/dL        Subjective      Jenn is here for fu of KEVIN. Doing well with celexa. No si/hi. Anxiety has been controlled. Had recent labs that continue to show hyperlipidemia and prediabetes. Working on dietary control which is difficult due to work schedule. Review of Systems   Constitutional: Negative. Negative for activity change, appetite change, chills, diaphoresis, fatigue and fever. HENT:  Negative for congestion, facial swelling and sore throat. Respiratory: Negative. Negative for apnea, cough, chest tightness and shortness of breath. Cardiovascular: Negative. Negative for chest pain and palpitations. Gastrointestinal: Negative. Negative for abdominal distention, abdominal pain, blood in stool, constipation, diarrhea and nausea. Genitourinary: Negative. Negative for difficulty urinating, dysuria, flank pain and frequency.        Current Outpatient Medications on File Prior to Visit   Medication Sig   • [DISCONTINUED] citalopram (CeleXA) 40 mg tablet Take 1 tablet (40 mg total) by mouth daily   • aspirin (ECOTRIN LOW STRENGTH) 81 mg EC tablet Take 1 tablet (81 mg total) by mouth 2 (two) times a day (Patient not taking: Reported on 5/18/2023)       Objective     /84   Pulse 77   Temp 98.6 °F (37 °C)   Ht 5' 5" (1.651 m)   Wt 129 kg (284 lb 6.4 oz)   BMI 47.33 kg/m²     Physical Exam  Vitals and nursing note reviewed. Constitutional:       Appearance: Normal appearance. She is well-developed. HENT:      Head: Normocephalic and atraumatic. Right Ear: External ear normal.      Left Ear: External ear normal.      Nose: Nose normal.   Eyes:      Conjunctiva/sclera: Conjunctivae normal.      Pupils: Pupils are equal, round, and reactive to light. Neck:      Thyroid: No thyromegaly. Trachea: No tracheal deviation. Cardiovascular:      Rate and Rhythm: Normal rate and regular rhythm. Heart sounds: Normal heart sounds. No murmur heard. Pulmonary:      Effort: Pulmonary effort is normal. No respiratory distress. Breath sounds: Normal breath sounds. No wheezing. Abdominal:      General: Bowel sounds are normal.      Palpations: Abdomen is soft. Musculoskeletal:         General: Normal range of motion. Cervical back: Normal range of motion and neck supple. Skin:     General: Skin is warm and dry. Capillary Refill: Capillary refill takes less than 2 seconds. Neurological:      General: No focal deficit present. Mental Status: She is alert and oriented to person, place, and time.    Psychiatric:         Mood and Affect: Mood normal.         Behavior: Behavior normal.       Claribel Sanderson MD

## 2024-02-22 ENCOUNTER — TELEPHONE (OUTPATIENT)
Dept: FAMILY MEDICINE CLINIC | Facility: HOSPITAL | Age: 49
End: 2024-02-22

## 2024-02-22 DIAGNOSIS — Z12.31 SCREENING MAMMOGRAM FOR BREAST CANCER: Primary | ICD-10-CM

## 2024-02-22 NOTE — TELEPHONE ENCOUNTER
Order placed    pt aware   The patient has been examined and the H&P has been reviewed:    I concur with the findings and no changes have occurred since H&P was written.    Surgery risks, benefits and alternative options discussed and understood by patient/family. All questions answered.    To OR for robotic assisted left salpingoophorectomy and pelvic mass removal.    Alejandra Faulkner MD PGY-3  Obstetrics and Gynecology            There are no hospital problems to display for this patient.

## 2024-04-22 ENCOUNTER — TELEPHONE (OUTPATIENT)
Age: 49
End: 2024-04-22

## 2024-04-22 NOTE — TELEPHONE ENCOUNTER
Patient called in to make follow up for ortho, advised she called General surgery and provided patient with contact to Ortho.

## 2024-04-30 ENCOUNTER — OFFICE VISIT (OUTPATIENT)
Dept: OBGYN CLINIC | Facility: CLINIC | Age: 49
End: 2024-04-30
Payer: COMMERCIAL

## 2024-04-30 ENCOUNTER — APPOINTMENT (OUTPATIENT)
Dept: RADIOLOGY | Facility: CLINIC | Age: 49
End: 2024-04-30
Payer: COMMERCIAL

## 2024-04-30 VITALS
HEIGHT: 65 IN | SYSTOLIC BLOOD PRESSURE: 120 MMHG | BODY MASS INDEX: 48.82 KG/M2 | WEIGHT: 293 LBS | DIASTOLIC BLOOD PRESSURE: 82 MMHG

## 2024-04-30 DIAGNOSIS — E66.01 MORBID OBESITY WITH BODY MASS INDEX (BMI) OF 45.0 TO 49.9 IN ADULT (HCC): ICD-10-CM

## 2024-04-30 DIAGNOSIS — M17.11 OSTEOARTHROSIS, LOCALIZED, PRIMARY, KNEE, RIGHT: ICD-10-CM

## 2024-04-30 DIAGNOSIS — M17.11 OSTEOARTHROSIS, LOCALIZED, PRIMARY, KNEE, RIGHT: Primary | ICD-10-CM

## 2024-04-30 PROCEDURE — 99214 OFFICE O/P EST MOD 30 MIN: CPT | Performed by: ORTHOPAEDIC SURGERY

## 2024-04-30 PROCEDURE — 20610 DRAIN/INJ JOINT/BURSA W/O US: CPT | Performed by: ORTHOPAEDIC SURGERY

## 2024-04-30 PROCEDURE — 73564 X-RAY EXAM KNEE 4 OR MORE: CPT

## 2024-04-30 RX ORDER — BETAMETHASONE SODIUM PHOSPHATE AND BETAMETHASONE ACETATE 3; 3 MG/ML; MG/ML
6 INJECTION, SUSPENSION INTRA-ARTICULAR; INTRALESIONAL; INTRAMUSCULAR; SOFT TISSUE
Status: COMPLETED | OUTPATIENT
Start: 2024-04-30 | End: 2024-04-30

## 2024-04-30 RX ORDER — LIDOCAINE HYDROCHLORIDE 10 MG/ML
7 INJECTION, SOLUTION INFILTRATION; PERINEURAL
Status: COMPLETED | OUTPATIENT
Start: 2024-04-30 | End: 2024-04-30

## 2024-04-30 RX ADMIN — LIDOCAINE HYDROCHLORIDE 7 ML: 10 INJECTION, SOLUTION INFILTRATION; PERINEURAL at 07:30

## 2024-04-30 RX ADMIN — BETAMETHASONE SODIUM PHOSPHATE AND BETAMETHASONE ACETATE 6 MG: 3; 3 INJECTION, SUSPENSION INTRA-ARTICULAR; INTRALESIONAL; INTRAMUSCULAR; SOFT TISSUE at 07:30

## 2024-04-30 NOTE — PROGRESS NOTES
Assessment:     1. Osteoarthrosis, localized, primary, knee, right    2. Morbid obesity with body mass index (BMI) of 45.0 to 49.9 in adult (Formerly [facility])        Plan:     Problem List Items Addressed This Visit          Musculoskeletal and Integument    Osteoarthrosis, localized, primary, knee, right - Primary    Relevant Medications    lidocaine (XYLOCAINE) 1 % injection 7 mL (Completed)    betamethasone acetate-betamethasone sodium phosphate (CELESTONE) injection 6 mg (Completed)    Other Relevant Orders    XR knee 4+ vw right injury    Ambulatory Referral to Physical Therapy    Large joint arthrocentesis: L knee (Completed)       Other    Morbid obesity with body mass index (BMI) of 45.0 to 49.9 in adult (Formerly [facility])    Relevant Orders    Ambulatory Referral to Weight Management      Findings consistent with moderate right knee osteoarthritis, recently irritated by injury.  Findings and treatment options were discussed with the patient.  X-rays were reviewed with her.  Discussed there is no evidence of acute ligamentous or meniscal injury at this time.  Recommend cortisone injection to the right knee joint to reduce the inflammation and pain.  She tolerated the procedure well.  Advised to apply cold compress today.  She is to continue oral NSAIDs as needed for pain.  She was referred to formal physical therapy to rehab the right knee as well.  Also discussed importance of weight loss at this time.  Explained that we put 3-5 times her body weight through our knees with walking.  Weight loss may help decrease her symptoms.  She was referred to weight management.  Follow-up in 2 to 3 months for reevaluation.  All patient's questions were answered to her satisfaction.  This note is created using dictation transcription.  It may contain typographical errors, grammatical errors, improperly dictated words, background noise and other errors.    Subjective:     Patient ID: Jenn Roman is a 48 y.o. female.  Chief Complaint:  This is a  48-year-old white female here for evaluation of her right knee.  She states that a few weeks ago she experienced an injury in her home.  She was carrying a laundry basket and she tripped over her dog in the hallway.  She landed on her right side.  She states she did have right knee pain immediately, but was able to bear weight.  She states the knee pain has been getting progressively worse over the past few weeks.  It is dull and aching with occasional sharp pain anteriorly.  She denies any locking, catching or giving away.  She feels she continues to limp since her injury.  She has been taking ibuprofen with minimal relief.  No other recent treatment.  She does have a history of a right knee arthroscopy with Dr. Cheng for medial and lateral meniscus tears on May 18, 2022.    Allergy:  No Known Allergies  Medications:  all current active meds have been reviewed  Past Medical History:  Past Medical History:   Diagnosis Date    Acute pain of right knee 2018    Anxiety     Dental disorder     last assessed: Dec 4, 2014    Effusion of right knee 2019    Generalized anxiety disorder 2018     Past Surgical History:  Past Surgical History:   Procedure Laterality Date     SECTION      x2    NH ARTHRS KNEE W/MENISCECTOMY MED&LAT W/SHAVING Right 2022    Procedure: MENISCECTOMY LATERAL /MEDIAL-arthroscopy;  Surgeon: Dar Cheng DO;  Location:  MAIN OR;  Service: Orthopedics    NH SURGICAL ARTHROSCOPY [place] W/CORACOACRM LIGM RLS Left 2022    Procedure: ARTHROSCOPIC SUBACROMIAL DECOMPRESSION;  Surgeon: Rob Sorto DO;  Location:  MAIN OR;  Service: Orthopedics    NH SURGICAL ARTHROSCOPY SHOULDER W/ROTATOR CUFF RPR Left 2022    Procedure: REPAIR ROTATOR CUFF  ARTHROSCOPIC;  Surgeon: Rob Sorto DO;  Location:  MAIN OR;  Service: Orthopedics     Family History:  Family History   Problem Relation Age of Onset    No Known Problems Mother     No Known Problems Father  "    Ovarian cysts Sister     No Known Problems Sister     No Known Problems Daughter     Ovarian cancer Maternal Grandmother         60's    No Known Problems Maternal Grandfather     No Known Problems Paternal Grandmother     No Known Problems Paternal Grandfather     No Known Problems Maternal Aunt     No Known Problems Maternal Aunt     No Known Problems Maternal Aunt     No Known Problems Maternal Aunt      Social History:  Social History     Substance and Sexual Activity   Alcohol Use Not Currently    Comment: Socially     Social History     Substance and Sexual Activity   Drug Use Never     Social History     Tobacco Use   Smoking Status Never    Passive exposure: Never   Smokeless Tobacco Never     Review of Systems   Constitutional: Negative.    HENT: Negative.     Eyes: Negative.    Respiratory: Negative.     Cardiovascular: Negative.    Gastrointestinal: Negative.    Endocrine: Negative.    Genitourinary: Negative.    Musculoskeletal:  Positive for arthralgias (right knee), gait problem (antalgic) and joint swelling (right knee).   Skin: Negative.    Allergic/Immunologic: Negative.    Hematological: Negative.    Psychiatric/Behavioral: Negative.           Objective:  BP Readings from Last 1 Encounters:   04/30/24 120/82      Wt Readings from Last 1 Encounters:   04/30/24 133 kg (294 lb)      BMI:   Estimated body mass index is 48.92 kg/m² as calculated from the following:    Height as of this encounter: 5' 5\" (1.651 m).    Weight as of this encounter: 133 kg (294 lb).  BSA:   Estimated body surface area is 2.33 meters squared as calculated from the following:    Height as of this encounter: 5' 5\" (1.651 m).    Weight as of this encounter: 133 kg (294 lb).   Physical Exam  Constitutional:       General: She is not in acute distress.     Appearance: She is well-developed.   HENT:      Head: Normocephalic.   Eyes:      Conjunctiva/sclera: Conjunctivae normal.      Pupils: Pupils are equal, round, and reactive " to light.   Pulmonary:      Effort: Pulmonary effort is normal. No respiratory distress.   Musculoskeletal:      Right knee: No effusion.      Instability Tests: Medial Jose test negative and lateral Jose test negative.   Skin:     General: Skin is warm and dry.   Neurological:      Mental Status: She is alert and oriented to person, place, and time.   Psychiatric:         Behavior: Behavior normal.       Right Knee Exam     Tenderness   The patient is experiencing tenderness in the lateral joint line and pes anserinus (patellofemoral).    Range of Motion   Extension:  normal   Flexion:  120     Tests   Jose:  Medial - negative Lateral - negative  Varus: negative Valgus: negative  Drawer:  Anterior - negative    Posterior - negative    Other   Erythema: absent  Scars: present  Sensation: normal  Pulse: present  Swelling: mild  Effusion: no effusion present    Comments:  Mild patellofemoral crepitation            I have personally reviewed pertinent films in PACS and my interpretation is x-rays of the right knee reveal moderate tricompartmental osteoarthritis worse in the patellofemoral and lateral compartments.    Large joint arthrocentesis: L knee  Universal Protocol:  Consent: Verbal consent obtained.  Risks and benefits: risks, benefits and alternatives were discussed  Consent given by: patient  Patient understanding: patient states understanding of the procedure being performed  Supporting Documentation  Indications: pain   Procedure Details  Location: knee - L knee  Preparation: Patient was prepped and draped in the usual sterile fashion  Needle size: 22 G  Ultrasound guidance: no  Approach: anterolateral  Medications administered: 7 mL lidocaine 1 %; 6 mg betamethasone acetate-betamethasone sodium phosphate 6 (3-3) mg/mL    Patient tolerance: patient tolerated the procedure well with no immediate complications  Dressing:  Sterile dressing applied         Scribe Attestation      I,:  Bruna Alfredo  NIKITA am acting as a scribe while in the presence of the attending physician.:       I,:  Brady Gerard MD personally performed the services described in this documentation    as scribed in my presence.:

## 2024-05-03 ENCOUNTER — EVALUATION (OUTPATIENT)
Dept: PHYSICAL THERAPY | Facility: CLINIC | Age: 49
End: 2024-05-03
Payer: COMMERCIAL

## 2024-05-03 DIAGNOSIS — M17.11 OSTEOARTHROSIS, LOCALIZED, PRIMARY, KNEE, RIGHT: Primary | ICD-10-CM

## 2024-05-03 PROCEDURE — 97162 PT EVAL MOD COMPLEX 30 MIN: CPT | Performed by: PHYSICAL THERAPIST

## 2024-05-03 NOTE — PROGRESS NOTES
PT Evaluation     Today's date: 5/3/2024  Patient name: Jenn Roman  : 1975  MRN: [id number]  Referring provider: Bruna Alfredo PA-C  Dx:   Encounter Diagnosis     ICD-10-CM    1. Osteoarthrosis, localized, primary, knee, right  M17.11 Ambulatory Referral to Physical Therapy                     Assessment  Assessment details: Pt is a 48 y.o. year old female coming to outpatient PT with a diagnosis of R knee OA s/p fall. Pt presents with increased pain and TTP, increased edema, decreased ROM, decreased strength, and overall decreased functional mobility. Pt would benefit from skilled PT services in order to address these deficits and reach maximum level of function. Thank you kindly for the referral!    Pt was issued a written HEP to be performed on a daily basis.   Impairments: abnormal gait, abnormal or restricted ROM, activity intolerance, impaired physical strength, lacks appropriate home exercise program, pain with function and weight-bearing intolerance  Barriers to therapy: High insurance deductible  Understanding of Dx/Px/POC: good   Prognosis: good    Goals  STG's ( 3-4 weeks)  1. Pt will be independent in HEP  2. Pt will have improved knee flexion ROM to WF's  LTG's ( 6- 8 weeks)  1. Improve FOTO score by 4-6 points  2. Pt will have decreased pain to 3/10 at worst  3. Pt will have improved R LE strength by 1/2 grade  4. Pt will be able to go up and down the steps reciprocally  5. Pt will be able to walk community distances with less pain, normalized gait    Plan  Patient would benefit from: PT eval and skilled physical therapy  Planned modality interventions: cryotherapy and electrical stimulation/Russian stimulation  Planned therapy interventions: joint mobilization, manual therapy, neuromuscular re-education, strengthening, stretching, therapeutic activities, therapeutic exercise, functional ROM exercises, flexibility and home exercise program  Frequency: 1x week  Duration in weeks: 8  Plan of Care  beginning date: 5/3/2024  Plan of Care expiration date: 2024  Treatment plan discussed with: PTA and patient        Subjective Evaluation    History of Present Illness  Mechanism of injury: Pt reports she was carrying a laundry basket and she tripped over her dog laying in the hallway, landing on her R knee lateral/anterior.  X-ray shows moderate OA. Pt received a cortisone injection with good relief. Pt had a prior arthroscopic knee surgery about 2 years ago.   Pt has increased pain walking, going up and down the steps, putting on her shoes and socks when twisting her knee. Pt places more weight on L LE when standing to prepare meals. Pt will be meeting with a dietician. Pt has abnormal sleep schedule with work. Pt has increased pain if she twists in bed.  Pt reports the pain feels like someone is shoving needles under her kneecap. Pt has clicking and cracking in her knee joint when transferring sit to stand.    Work: vet   Hobbies: horses  Gait: mild antalgic gait, decreased weight bearing R LE    Patient Goals  Patient goals for therapy: decreased pain, independence with ADLs/IADLs and return to sport/leisure activities  Patient goal: to be able to put on my socks and shoes; to walk up steps reciprocally; to walk without a limp  Pain  At best pain ratin  At worst pain ratin  Location: R knee  Quality: sharp and knife-like    Social Support  Steps to enter house: yes (2)  Stairs in house: no   Lives in: one-story house    Employment status: working    Diagnostic Tests  X-ray: abnormal  Treatments  Previous treatment: injection treatment        Objective     Neurological Testing     Sensation     Hip   Left Hip   Intact: light touch    Right Hip   Intact: light touch    Knee   Left Knee   Intact: Light touch    Right Knee   Intact: light touch     Reflexes   Left   Achilles (S1): normal (2+)    Right   Achilles (S1): normal (2+)    Additional Neurological Details  Unable to elicit B  "patellar reflexes possibly due to body habitus    Active Range of Motion   Left Knee   Flexion: 120 degrees   Extension: 0 degrees     Right Knee   Flexion: 104 degrees with pain  Extension: 0 degrees     Additional Active Range of Motion Details  (+) moderate edema R medial knee  (-) ligamentous testing for laxity; ACL, PCL, LCL, MCL  Good patellar mobility    Passive Range of Motion     Right Knee   Flexion: 110 degrees with pain  Extension: 0 degrees     Additional Passive Range of Motion Details  HS flexibility: R: 50*  L: 75 with opposite knee extended    Strength/Myotome Testing     Left Hip   Normal muscle strength    Right Hip   Planes of Motion   Flexion: 3  Extension: 4  Abduction: 4  Adduction: 3  External rotation: 3 (pain)  Internal rotation: 3 (pain)    Left Knee   Normal strength    Right Knee   Flexion: 4+  Extension: 4+  Quadriceps contraction: fair    Additional Strength Details  (+) TTP R piriformis and R ITB  Pt is able to do a low SLR with effort           Dx: R knee mod OA s/p fall  EPOC: 6/28/24  CO-MORBIDITIES:  PERSONAL FACTORS:   Precautions: none; avoid mini squats      Manuals             R knee PROM/ MFR             K tape for support                                       Neuro Re-Ed             Stand TKE w/ TB             Bridges w/ pball under leg             Sidestepping on foam                                                                 Ther Ex             Bike for ROM             Stand HR             Stand gastroc stretch             Tailgate swing             Knee flexion stretch on step             Quad sets             SLR flexion             S/l hip abd             Seated HS stretch             Ther Activity             Step ups fW 4\"            Step ups lat 4\"            Gait Training                                       Modalities                                            "

## 2024-05-07 ENCOUNTER — OFFICE VISIT (OUTPATIENT)
Dept: FAMILY MEDICINE CLINIC | Facility: HOSPITAL | Age: 49
End: 2024-05-07
Payer: COMMERCIAL

## 2024-05-07 VITALS
HEIGHT: 65 IN | DIASTOLIC BLOOD PRESSURE: 84 MMHG | TEMPERATURE: 97 F | WEIGHT: 288 LBS | OXYGEN SATURATION: 97 % | HEART RATE: 84 BPM | SYSTOLIC BLOOD PRESSURE: 128 MMHG | BODY MASS INDEX: 47.98 KG/M2

## 2024-05-07 DIAGNOSIS — J02.9 PHARYNGITIS, UNSPECIFIED ETIOLOGY: Primary | ICD-10-CM

## 2024-05-07 PROCEDURE — 87880 STREP A ASSAY W/OPTIC: CPT | Performed by: NURSE PRACTITIONER

## 2024-05-07 PROCEDURE — 99214 OFFICE O/P EST MOD 30 MIN: CPT | Performed by: NURSE PRACTITIONER

## 2024-05-07 NOTE — PROGRESS NOTES
Assessment/Plan:     Rapid strep negative. Send throat culture to confirm.   Ibuprofen and/or Tylenol for pain control.      Diagnoses and all orders for this visit:    Pharyngitis, unspecified etiology  -     POCT rapid ANTIGEN strepA  -     Throat culture          Subjective:     Patient ID: Jenn Roman is a 48 y.o. female.    Has sore throat that started 2 days ago. Today it feels worse. Granddaughter has strep. No fever, chills. Has chronic post nasal drip. Denies allergies. No nasal congestion or runny nose. No HA, abd pain, N/V/D. `    Sore Throat   Pertinent negatives include no abdominal pain, congestion, coughing, diarrhea, headaches or vomiting.       Review of Systems   Constitutional:  Negative for chills and fever.   HENT:  Positive for postnasal drip and sore throat. Negative for congestion and rhinorrhea.    Respiratory:  Negative for cough.    Gastrointestinal:  Negative for abdominal pain, diarrhea, nausea and vomiting.   Neurological:  Negative for headaches.         The following portions of the patient's history were reviewed and updated as appropriate: allergies, current medications, past family history, past medical history, past social history, past surgical history and problem list.    Objective:  Vitals:    05/07/24 1716   BP: 128/84   Pulse: 84   Temp: (!) 97 °F (36.1 °C)   SpO2: 97%      Physical Exam  Vitals reviewed.   Constitutional:       General: She is not in acute distress.     Appearance: She is well-developed. She is not ill-appearing.   HENT:      Right Ear: Tympanic membrane and ear canal normal.      Left Ear: Tympanic membrane and ear canal normal.      Mouth/Throat:      Mouth: Mucous membranes are moist.      Pharynx: Oropharynx is clear. No oropharyngeal exudate or posterior oropharyngeal erythema.      Tonsils: No tonsillar exudate.   Cardiovascular:      Rate and Rhythm: Normal rate and regular rhythm.      Heart sounds: Normal heart sounds. No murmur  heard.  Lymphadenopathy:      Cervical: No cervical adenopathy.   Skin:     General: Skin is warm and dry.   Neurological:      Mental Status: She is alert and oriented to person, place, and time.   Psychiatric:         Mood and Affect: Mood normal.         Behavior: Behavior normal.

## 2024-05-10 ENCOUNTER — EVALUATION (OUTPATIENT)
Dept: PHYSICAL THERAPY | Facility: CLINIC | Age: 49
End: 2024-05-10
Payer: COMMERCIAL

## 2024-05-10 DIAGNOSIS — M17.11 OSTEOARTHROSIS, LOCALIZED, PRIMARY, KNEE, RIGHT: Primary | ICD-10-CM

## 2024-05-10 PROCEDURE — 97140 MANUAL THERAPY 1/> REGIONS: CPT | Performed by: PHYSICAL THERAPIST

## 2024-05-10 PROCEDURE — 97110 THERAPEUTIC EXERCISES: CPT | Performed by: PHYSICAL THERAPIST

## 2024-05-10 NOTE — PROGRESS NOTES
"Daily Note     Today's date: 5/10/2024  Patient name: Jenn Roman  : 1975  MRN: [id number]  Referring provider: Bruna Alfredo PA-C  Dx:   Encounter Diagnosis     ICD-10-CM    1. 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      Objective: See treatment diary below      Assessment: Tolerated treatment well. Patient exhibited good technique with therapeutic exercises. Instructed pt in K tape for knee support. Pt had increased medial knee pain in adductor region with manual therapy.      Plan: Continue per plan of care. Focus on decreasing pain and improving strength.     Dx: R knee mod OA s/p fall  EPOC: 24  CO-MORBIDITIES:  PERSONAL FACTORS:   Precautions: none; avoid mini squats      Manuals 5/10            R knee PROM/ MFR 10'            K tape for support Instructed                                       Neuro Re-Ed             Stand TKE w/ TB 10x5\"            Bridges w/ pball under leg 10x5\"            Sidestepping on foam                                                                 Ther Ex             Bike for ROM 5'            Stand HR slt bd 10            Stand gastroc stretch slt bd 1'            Tailgate swing             Knee flexion stretch on step 10x10\"            Quad sets 10x5\"            SLR flexion 2x5 AA            S/l hip abd 10            Seated HS stretch             Ther Activity             Step ups fW 4\" x10            Step ups lat 4\"x10            Gait Training                                       Modalities                                            "

## 2024-05-11 LAB — B-HEM STREP SPEC QL CULT: NEGATIVE

## 2024-05-14 NOTE — PROGRESS NOTES
Weight Management Medical Nutrition Assessment    Patient presents for menu planning session with RD. 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 Will work 4 hours Wednesdays 6p-10p to make 40 hrs.    Feels her schedule and lack of eating consistency make weight loss challenging for her.     Goals for this encounter:  Eliminate caloric beverage intakes  Food journal to capture when and what you are typically eating to work towards 3 consistent meals daily    Patient is not interested in bariatric surgery or weight loss medications at this time.     Patient seen by Medical Provider in past 6 months:  no  Requested to schedule appointment with Medical Provider: No    Anthropometric Measurements  Start Weight (#): 291.2  Current Weight (#): 291.2  TBW % Change from start weight:n/a  Ideal Body Weight (#):127.5  Goal Weight (#):180-190#  Highest: current  Lowest:275#    Weight Loss History  Previous weight loss attempts: Commercial Programs (Weight Watchers, Equinext, etc.)  Exercise  Self Created Diets (Portion Control, Healthy Food Choices, etc.)    Food and Nutrition Related History  Wake up: 7:30a   Bed Time:12p  -works as a  at am ER vet  - sleep is limited and sometimes interrupted    Food Recall  5p Breakfast:ulises cheese sandwich or broccoli and cheddar soup, sometimes a yogurt or bag of chips with    Snack:has a bag of pretzels in her space at work that she will snack at throughout the night until 11p  Does not eat again until she goes back into work    Non work days  -may eat 2 meals all depends on her day (if she is visiting her daughter, horses or picking up granddaughter)  -pizza place (cheese steak or cervantes salad)  - if cooks spaghetti or goulash or chicken with rice    Beverages: sweetened beverages (pink lemonade) and regular soda, gatorade, iced coffee from  [employer] (medium, mocha flavored), OJ  -does not drink alcohol  Volume of beverage intake: 20-40 oz soda/pink lemonade from vending machine at work), 16-20oz gatorade, 20-40 oz coffee from [employer], 16oz OJ    Weekends: Improved, non work days more frequent meals but still random  Cravings: crunch  Trouble area of day:inconsistent all day    Frequency of Eating out: daily  Food restrictions:none  Cooking: self   Food Shopping: self    Physical Activity Intake  Activity:PT 1x/ every 2 weeks   Frequency:intermittently  Physical limitations/barriers to exercise: in PT currently for knee (arthritis)    Estimated Needs  Energy  SECA: BMR:n/a      X 1.3 -1000 =  [address] [employer] Energy Needs: BMR : 1955   1-2# loss weekly sedentary:  3841-0438             1-2# loss weekly lightly active:0170-2765  Maintenance calories for sedentary activity level: 2955  Protein:70-87      (1.2-1.5g/kg IBW)  Fluid: 68oz     (35mL/kg IBW)    Nutrition Diagnosis  Yes;    Overweight/obesity  related to Disordered eating pattern as evidenced by  BMI more than normative standard for age and sex (obesity-grade III 40+)       Nutrition Intervention    Nutrition Prescription  Calories:2303-4280  Protein: 80g  Fluid:70oz    Meal Plan (Boris/Pro/Carb)  Breakfast:300-400/15-30  Snack:100-200/5-15  Lunch:400-500/25-40  Snack:100-200/5-15  Dinner:400-500/25-40  Snack:-    Nutrition Education:    Calorie controlled menu  Lean protein food choices  Healthy snack options  Food journaling tips      Nutrition Counseling:  Strategies: meal planning, portion sizes, healthy snack choices, hydration, fiber intake, protein intake, exercise, food journal      Monitoring and Evaluation:  Evaluation criteria:  Energy Intake  Meet protein needs  Maintain adequate hydration  Monitor weekly weight  Meal planning/preparation  Food journal   Decreased portions at mealtimes and snacks  Physical activity     Barriers to learning:none  Readiness to change: Preparation:  (Getting  ready to change)   Comprehension: very good  Expected Compliance: good

## 2024-05-16 ENCOUNTER — CLINICAL SUPPORT (OUTPATIENT)
Dept: BARIATRICS | Facility: CLINIC | Age: 49
End: 2024-05-16

## 2024-05-16 VITALS — BODY MASS INDEX: 46.8 KG/M2 | HEIGHT: 66 IN | WEIGHT: 291.2 LBS

## 2024-05-16 DIAGNOSIS — E66.01 MORBID OBESITY WITH BODY MASS INDEX (BMI) OF 45.0 TO 49.9 IN ADULT (HCC): ICD-10-CM

## 2024-05-16 DIAGNOSIS — E66.01 OBESITY, CLASS III, BMI 40-49.9 (MORBID OBESITY) (HCC): ICD-10-CM

## 2024-05-16 DIAGNOSIS — R63.5 ABNORMAL WEIGHT GAIN: Primary | ICD-10-CM

## 2024-05-16 PROCEDURE — WMDI30: Performed by: DIETITIAN, REGISTERED

## 2024-05-16 PROCEDURE — RECHECK: Performed by: DIETITIAN, REGISTERED

## 2024-05-31 ENCOUNTER — APPOINTMENT (OUTPATIENT)
Dept: PHYSICAL THERAPY | Facility: CLINIC | Age: 49
End: 2024-05-31
Payer: COMMERCIAL

## 2024-06-13 ENCOUNTER — TELEPHONE (OUTPATIENT)
Dept: BARIATRICS | Facility: CLINIC | Age: 49
End: 2024-06-13

## 2024-07-16 ENCOUNTER — OFFICE VISIT (OUTPATIENT)
Dept: DERMATOLOGY | Facility: CLINIC | Age: 49
End: 2024-07-16
Payer: COMMERCIAL

## 2024-07-16 VITALS — WEIGHT: 287 LBS | TEMPERATURE: 96.7 F | BODY MASS INDEX: 47.03 KG/M2

## 2024-07-16 DIAGNOSIS — L91.8 ACROCHORDON: ICD-10-CM

## 2024-07-16 DIAGNOSIS — L81.4 LENTIGO: ICD-10-CM

## 2024-07-16 DIAGNOSIS — B35.1 ONYCHOMYCOSIS: Primary | ICD-10-CM

## 2024-07-16 PROCEDURE — 99204 OFFICE O/P NEW MOD 45 MIN: CPT | Performed by: DERMATOLOGY

## 2024-07-16 RX ORDER — TERBINAFINE HYDROCHLORIDE 250 MG/1
TABLET ORAL
Qty: 90 TABLET | Refills: 0 | Status: SHIPPED | OUTPATIENT
Start: 2024-07-16 | End: 2024-10-14

## 2024-07-16 NOTE — PROGRESS NOTES
"[facility] Dermatology Clinic Note     Patient Name: Jenn Roman  Encounter Date: 7/16/24     Have you been cared for by a [facility] Dermatologist in the last 3 years and, if so, which description applies to you?    NO.   I am considered a \"new\" patient and must complete all patient intake questions. I am FEMALE/of child-bearing potential.    REVIEW OF SYSTEMS:  Have you recently had or currently have any of the following? Recent fever or chills? No  Any non-healing wound? No  Are you pregnant or planning to become pregnant? No  Are you currently or planning to be nursing or breast feeding? No   PAST MEDICAL HISTORY:  Have you personally ever had or currently have any of the following?  If \"YES,\" then please provide more detail. Skin cancer (such as Melanoma, Basal Cell Carcinoma, Squamous Cell Carcinoma?  No  Tuberculosis, HIV/AIDS, Hepatitis B or C: No  Radiation Treatment No   HISTORY OF IMMUNOSUPPRESSION:   Do you have a history of any of the following:  Systemic Immunosuppression such as Diabetes, Biologic or Immunotherapy, Chemotherapy, Organ Transplantation, Bone Marrow Transplantation?  No    Answering \"YES\" requires the addition of the dotphrase \"IMMUNOSUPPRESSED\" as the first diagnosis of the patient's visit.   FAMILY HISTORY:  Any \"first degree relatives\" (parent, brother, sister, or child) with the following?    Skin Cancer, Pancreatic or Other Cancer? No   PATIENT EXPERIENCE:    Do you want the Dermatologist to perform a COMPLETE skin exam today including a clinical examination under the \"bra and underwear\" areas?  NO  If necessary, do we have your permission to call and leave a detailed message on your Preferred Phone number that includes your specific medical information?  Yes      No Known Allergies   Current Outpatient Medications:   •  citalopram (CeleXA) 40 mg tablet, Take 1 tablet (40 mg total) by mouth daily, Disp: 90 tablet, Rfl: 1  •  terbinafine (LamISIL) 250 mg tablet, Take once daily for 3 " "months, Disp: 90 tablet, Rfl: 0  •  aspirin (ECOTRIN LOW STRENGTH) 81 mg EC tablet, Take 1 tablet (81 mg total) by mouth 2 (two) times a day (Patient not taking: Reported on 5/18/2023), Disp: 60 tablet, Rfl: 0          Whom besides the patient is providing clinical information about today's encounter?   NO ADDITIONAL HISTORIAN (patient alone provided history)    Physical Exam and Assessment/Plan by Diagnosis:      ACROCHORDON (\"SKIN TAG\")    Physical Exam:  Anatomic Location Affected:  neck  Morphological Description:  skin colored tags  Pertinent Positives:  Pertinent Negatives:    Additional History of Present Condition:  get caught in necklace and clothes    Assessment and Plan:  Based on a thorough discussion of this condition and the management approach to it (including a comprehensive discussion of the known risks, side effects and potential benefits of treatment), the patient (family) agrees to implement the following specific plan:  Reassured benign  Can consider removal, cost is from 1-10 $150; not covered under insurance, considered cosmetic    Skin tags are common, soft, harmless skin lesions that are also called, in the appropriate settings, papillomas, fibroepithelial polyps, and soft fibromas.  They are made up of loosely arranged collagen fibers and blood vessels surrounded by a thickened or thinned-out epidermis.    Skin tags tend to develop in both men and women as we grow older.  They are usually found on the skin folds (neck, armpits, groin).  It is not known what specifically causes skin tags.  Certain factors, though, do appear to play a role:  Chaffing and irritation from skin rubbing together  High levels of growth factors (as seen, for example, in pregnancy or in acromegaly/gigantism)  Insulin resistance  Human papillomavirus (wart virus)    We discussed that most skin tags do not need to be treated unless they are specifically causing the patient physical distress or limitation or pose a " "risk for a larger problem such as an infection that forms secondary to excoriation or chronic irritation.    We had a thorough discussion of treatment options and specific risks (including that any procedural treatment may not be covered by insurance and would then be the patient's responsibility) and benefits/alternatives including but not limited to the following:  Cryotherapy (freezing)  Shave removal  Surgical excision (snip excision with scissors)  Electrosurgery  Ligation (we do not do this procedure and counseled against it due to risk of tissue necrosis and infection)    ONYCHOMYCOSIS (\"FUNGAL NAIL\")    Physical Exam:  Anatomic Location Affected:  right and left toenails, right first and second nails  Morphological Description:  thickened brittle nails, scaling on right sole, dystrophy of 3rd & 4th toenails  Pertinent Positives:  Pertinent Negatives:    Additional History of Present Condition:  patient notes that had for a few years, no prior treatments, normal CMP other than slight elevation in blood sugar, no HX of HEP B or C    Assessment and Plan:  Based on a thorough discussion of this condition and the management approach to it (including a comprehensive discussion of the known risks, side effects and potential benefits of treatment), the patient (family) agrees to implement the following specific plan:  Discussed that topicals not an option today  Lamisil  250 mg take once daily for 3 months; if rash occurs stop taking; will take a full year to regrown toenail  Get over the counter Antifungal sprays for shoes  Get order labs in 1 month CBC. CMP.  I discussed rare idiosyncratic reactions including liver    What are fungal nail infections?  Fungal infection of the nails is also known as onychomycosis. It is increasingly common with increased age. It rarely affects children.    Which organisms cause onychomycosis?  Onychomycosis can be due to:  Dermatophytes such as Trichophyton rubrum (T. rubrum), T. " interdigitale (tinea unguium)   Yeasts such as Candida albicans   Molds such as Scopulariopsis brevicaulis and Fusarium species.    What are the clinical features of onychomycosis?  Onychomycosis may affect one or more toenails and fingernails and most often involves the great toenail or the little toenail. It can present in one or several different patterns.  Lateral onychomycosis: a white or yellow opaque streak appears at one side of the nail.   Subungual hyperkeratosis: scaling occurs under the nail.   Distal onycholysis: the end of the nail lifts. The free edge often crumbles.   Superficial white onychomycosis: flaky white patches and pits appear on the top of the nail plate.   Proximal onychomycosis:yellow spots appear in the half-moon (lunula).   Onychoma or dermatophytoma:a thick localized area of infection in the nail plate   Destruction of the nail    Tinea unguium often results from untreated tinea pedis (feet) or tinea manuum (hand). It may follow an injury to the nail or inflammatory disease of the nail.  Candida infection of the nail plate generally results from paronychia and starts near the nail fold (the cuticle). The nail fold is swollen and red, lifted off the nail plate. White, yellow, green or black marks appear on the nearby nail and spread. The nail may lift off its bed and is tender if you press on it.  Mold infections are similar in appearance to tinea unguium.  Onychomycosis must be distinguished from other nail disorders.  Bacterial infection especially Pseudomonas aeruginosa, which turns the nail black or green   Psoriasis   Eczema or dermatitis   Lichen planus   Viral warts   Onycholysis   Onychogryphosis (nail thickening and scaling under the nail), common in the elderly    How is the diagnosis of onychomycosis confirmed?  Clippings should be taken from crumbling tissue at the end of the infected nail. The discolored surface of the nails can be scraped off. The debris can be scooped out  from under the nail. The clippings and scrapings are sent to a mycology laboratory for microscopy and culture.  Previous treatment can reduce the chance of growing the fungus successfully in a culture, so it is best to take the clippings before any treatment is commenced:  To confirm the diagnosis -- antifungal treatment will not be successful if there is another explanation for the nail condition   To identify the responsible organism. Molds and yeasts may require different treatment from dermatophyte fungi   Treatment may be required for a prolonged period and is expensive. Partially treated infection may be impossible to prove for many months as antifungal drugs can be detected even a year later.  A nail biopsy may also reveal characteristic histopathological features of onychomycosis.    What is the treatment of onychomycosis?  Fingernail infections are usually cured more quickly and effectively than toenail infections.  Mild infections affecting less than 50% of one or two nails may respond to topical antifungal medications, but cure usually requires an oral antifungal medication for several months.    Devices used to treat onychomycosis  Recently, non-drug treatment has been developed to treat onychomycosis thus avoiding the side effects and risks of oral antifungal drugs.  Lasers emitting infrared radiation are thought to kill fungi by the production of heat within the infected tissue. Laser treatment is reported to safely eradicate nail fungi with one to three, almost painless, sessions. Several lasers have been approved for this purpose by the FDA and other regulatory authorities. However, high-quality studies of efficacy are lacking, and existing studies indicate that laser treatment is less medically effective than topical or oral antifungal agents.  Nd:YAG continuous, long or short-pulsed lasers   Ti:Sapphire modelocked laser   Diode laser      LENTIGO    Physical Exam:  Anatomic Location Affected:  left  temple  Morphological Description:  brown patch  Pertinent Positives:  Pertinent Negatives:    Additional History of Present Condition:  patient notes that getting more prominent    Assessment and Plan:  Based on a thorough discussion of this condition and the management approach to it (including a comprehensive discussion of the known risks, side effects and potential benefits of treatment), the patient (family) agrees to implement the following specific plan:  Over the counter Neutrogena Retinol to face    What is a lentigo?  A lentigo is a pigmented flat or slightly raised lesion with a clearly defined edge. Unlike an ephelis (freckle), it does not fade in the winter months. There are several kinds of lentigo.  The name lentigo originally referred to its appearance resembling a small lentil. The plural of lentigo is lentigines, although “lentigos” is also in common use.    Who gets lentigines?  Lentigines can affect males and females of all ages and races. Solar lentigines are especially prevalent in fair skinned adults. Lentigines associated with syndromes are present at birth or arise during childhood.    What causes lentigines?  Common forms of lentigo are due to exposure to ultraviolet radiation:  Sun damage including sunburn   Indoor tanning   Phototherapy, especially photochemotherapy (PUVA)    Ionizing radiation, eg radiation therapy, can also cause lentigines.  Several familial syndromes associated with widespread lentigines originate from mutations in Chaz-MAP kinase, mTOR signaling and PTEN pathways.    What are the clinical features of lentigines?  Lentigines have been classified into several different types depending on what they look like, where they appear on the body, causative factors, and whether they are associated to other diseases or conditions.  Lentigines may be solitary or more often, multiple. Most lentigines are smaller than 5 mm in diameter.    Lentigo simplex  A precursor to junctional  naevus   Arises during childhood and early adult life   Found on trunk and limbs   Small brown round or oval macule or thin plaque   Jagged or smooth edge   May have a dry surface   May disappear in time  Solar lentigo  A precursor to seborrhoeic keratosis   Found on chronically sun exposed sites such as hands, face, lower legs   May also follow sunburn to shoulders   Yellow, light or dark brown regular or irregular macule or thin plaque   May have a dry surface   Often has moth-eaten outline   Can slowly enlarge to several centimeters in diameter   May disappear, often through the process known as lichenoid keratosis   When atypical in appearance, may be difficult to distinguish from melanoma in situ  Ink spot lentigo  Also known as reticulated lentigo   Few in number compared to solar lentigines   Follows sunburn in very fair skinned individuals   Dark brown to black irregular ink spot-like macule  PUVA lentigo  Similar to ink spot lentigo but follows photochemotherapy (PUVA)   Location anywhere exposed to PUVA  Tanning bed lentigo  Similar to ink spot lentigo but follows indoor tanning   Location anywhere exposed to tanning bed  Radiation lentigo  Occurs in site of irradiation (accidental or therapeutic)   Associated with late-stage radiation dermatitis: epidermal atrophy, subcutaneous fibrosis, keratosis, telangiectasias  Melanotic macule  Mucosal surfaces or adjacent glabrous skin eg lip, vulva, penis, anus   Light to dark brown   Also called mucosal melanosis  Generalised lentigines  Found on any exposed or covered site from early childhood   Small macules may merge to form larger patches   Not associated with a syndrome   Also called lentigines profusa, multiple lentigines  Agminated lentigines  Naevoid eruption of lentigos confined to a single segmental area   Sharp demarcation in midline   May have associated neurological and developmental abnormalities  Patterned lentigines  Inherited tendency to  lentigines on face, lips, buttocks, palms, soles   Recognised mainly in people of  ethnicity  Centrofacial neurodysraphic lentiginosis  Associated with mental retardation  Lentiginosis syndromes  Syndromes include LEOPARD/Stanislaw, Peutz-Jeghers, Laugier-Hunziker, Moynahan, Xeroderma pigmentosum, myxoma syndromes (BAUMANN, NAME, Darby), Ruvalcaba-Myhre-Sorto, Bannayan-Zonnana syndrome, Cowden disease (multiple hamartoma syndrome )   Inheritance is autosomal dominant; sporadic cases common   Widespread lentigines present at birth or arise in early childhood   Associated with neural, endocrine, and mesenchymal tumors    How is the diagnosis made?  Lentigines are usually diagnosed clinically by their typical appearance. Concern regarding possibility of melanoma may lead to:  Dermatoscopy   Diagnostic excision biopsy    Histopathology of a lentigo shows:  Thickened epidermis   An increased number of melanocytes along the basal layer of epidermis   Unlike junctional melanocytic naevus, there are no nests of melanocytes   Increased melanin pigment within the keratinocytes   Additional features depending on type of lentigo    In contrast, an ephelis (freckle) shows sun-induced increased melanin within the keratinocytes, without an increase in number of cells.  What is the treatment for lentigines?  Most lentigines are left alone. Attempts to lighten them may not be successful. The following approaches are used:  SPF 50+ broad-spectrum sunscreen   Hydroquinone bleaching cream   Alpha hydroxy acids   Vitamin C   Retinoids   Azelaic acid   Chemical peels  Individual lesions can be permanently removed using:  Cryotherapy   Intense pulsed light   Pigment lasers    How can lentigines be prevented?  Lentigines associated with exposure ultraviolet radiation can be prevented by very careful sun protection. Clothing is more successful at preventing new lentigines than are sunscreens.    What is the outlook for  lentigines?  Lentigines usually persist. They may increase in number with age and sun exposure. Some in sun-protected sites may fade and disappear.      Scribe Attestation    I,:  Barbara Pardo am acting as a scribe while in the presence of the attending physician.:       I,:  Ammon Spring MD personally performed the services described in this documentation    as scribed in my presence.:

## 2024-07-16 NOTE — PATIENT INSTRUCTIONS
"[place] (\"SKIN TAG\")    Assessment and Plan:  Based on a thorough discussion of this condition and the management approach to it (including a comprehensive discussion of the known risks, side effects and potential benefits of treatment), the patient (family) agrees to implement the following specific plan:  Reassured benign  Can consider removal, cost is from 1-10 $150; not covered under insurance, considered cosmetic    Skin tags are common, soft, harmless skin lesions that are also called, in the appropriate settings, papillomas, fibroepithelial polyps, and soft fibromas.  They are made up of loosely arranged collagen fibers and blood vessels surrounded by a thickened or thinned-out epidermis.    Skin tags tend to develop in both men and women as we grow older.  They are usually found on the skin folds (neck, armpits, groin).  It is not known what specifically causes skin tags.  Certain factors, though, do appear to play a role:  Chaffing and irritation from skin rubbing together  High levels of growth factors (as seen, for example, in pregnancy or in acromegaly/gigantism)  Insulin resistance  Human papillomavirus (wart virus)    We discussed that most skin tags do not need to be treated unless they are specifically causing the patient physical distress or limitation or pose a risk for a larger problem such as an infection that forms secondary to excoriation or chronic irritation.    We had a thorough discussion of treatment options and specific risks (including that any procedural treatment may not be covered by insurance and would then be the patient's responsibility) and benefits/alternatives including but not limited to the following:  Cryotherapy (freezing)  Shave removal  Surgical excision (snip excision with scissors)  Electrosurgery  Ligation (we do not do this procedure and counseled against it due to risk of tissue necrosis and infection)    ONYCHOMYCOSIS (\"FUNGAL NAIL\")    Assessment and " Plan:  Based on a thorough discussion of this condition and the management approach to it (including a comprehensive discussion of the known risks, side effects and potential benefits of treatment), the patient (family) agrees to implement the following specific plan:  Discussed that topicals not an option today  Lamisil  250 mg take once daily for 3 months; if rash occurs stop taking; will take a full year to regrown toenail  Get over the counter Antifungal sprays for shoes    What are fungal nail infections?  Fungal infection of the nails is also known as onychomycosis. It is increasingly common with increased age. It rarely affects children.    Which organisms cause onychomycosis?  Onychomycosis can be due to:  Dermatophytes such as Trichophyton rubrum (T. rubrum), T. interdigitale (tinea unguium)   Yeasts such as Candida albicans   Molds such as Scopulariopsis brevicaulis and Fusarium species.    What are the clinical features of onychomycosis?  Onychomycosis may affect one or more toenails and fingernails and most often involves the great toenail or the little toenail. It can present in one or several different patterns.  Lateral onychomycosis: a white or yellow opaque streak appears at one side of the nail.   Subungual hyperkeratosis: scaling occurs under the nail.   Distal onycholysis: the end of the nail lifts. The free edge often crumbles.   Superficial white onychomycosis: flaky white patches and pits appear on the top of the nail plate.   Proximal onychomycosis:yellow spots appear in the half-moon (lunula).   Onychoma or dermatophytoma:a thick localized area of infection in the nail plate   Destruction of the nail    Tinea unguium often results from untreated tinea pedis (feet) or tinea manuum (hand). It may follow an injury to the nail or inflammatory disease of the nail.  Candida infection of the nail plate generally results from paronychia and starts near the nail fold (the cuticle). The nail fold is  swollen and red, lifted off the nail plate. White, yellow, green or black marks appear on the nearby nail and spread. The nail may lift off its bed and is tender if you press on it.  Mold infections are similar in appearance to tinea unguium.  Onychomycosis must be distinguished from other nail disorders.  Bacterial infection especially Pseudomonas aeruginosa, which turns the nail black or green   Psoriasis   Eczema or dermatitis   Lichen planus   Viral warts   Onycholysis   Onychogryphosis (nail thickening and scaling under the nail), common in the elderly    How is the diagnosis of onychomycosis confirmed?  Clippings should be taken from crumbling tissue at the end of the infected nail. The discolored surface of the nails can be scraped off. The debris can be scooped out from under the nail. The clippings and scrapings are sent to a mycology laboratory for microscopy and culture.  Previous treatment can reduce the chance of growing the fungus successfully in a culture, so it is best to take the clippings before any treatment is commenced:  To confirm the diagnosis -- antifungal treatment will not be successful if there is another explanation for the nail condition   To identify the responsible organism. Molds and yeasts may require different treatment from dermatophyte fungi   Treatment may be required for a prolonged period and is expensive. Partially treated infection may be impossible to prove for many months as antifungal drugs can be detected even a year later.  A nail biopsy may also reveal characteristic histopathological features of onychomycosis.    What is the treatment of onychomycosis?  Fingernail infections are usually cured more quickly and effectively than toenail infections.  Mild infections affecting less than 50% of one or two nails may respond to topical antifungal medications, but cure usually requires an oral antifungal medication for several months.    Devices used to treat  onychomycosis  Recently, non-drug treatment has been developed to treat onychomycosis thus avoiding the side effects and risks of oral antifungal drugs.  Lasers emitting infrared radiation are thought to kill fungi by the production of heat within the infected tissue. Laser treatment is reported to safely eradicate nail fungi with one to three, almost painless, sessions. Several lasers have been approved for this purpose by the FDA and other regulatory authorities. However, high-quality studies of efficacy are lacking, and existing studies indicate that laser treatment is less medically effective than topical or oral antifungal agents.  Nd:YAG continuous, long or short-pulsed lasers   Ti:Sapphire modelocked laser   Diode laser      LENTIGO    Assessment and Plan:  Based on a thorough discussion of this condition and the management approach to it (including a comprehensive discussion of the known risks, side effects and potential benefits of treatment), the patient (family) agrees to implement the following specific plan:  Over the counter Neutrogena Retinol to face      What is a lentigo?  A lentigo is a pigmented flat or slightly raised lesion with a clearly defined edge. Unlike an ephelis (freckle), it does not fade in the winter months. There are several kinds of lentigo.  The name lentigo originally referred to its appearance resembling a small lentil. The plural of lentigo is lentigines, although “lentigos” is also in common use.    Who gets lentigines?  Lentigines can affect males and females of all ages and races. Solar lentigines are especially prevalent in fair skinned adults. Lentigines associated with syndromes are present at birth or arise during childhood.    What causes lentigines?  Common forms of lentigo are due to exposure to ultraviolet radiation:  Sun damage including sunburn   Indoor tanning   Phototherapy, especially photochemotherapy (PUVA)    Ionizing radiation, eg radiation therapy, can also  cause lentigines.  Several familial syndromes associated with widespread lentigines originate from mutations in Chaz-MAP kinase, mTOR signaling and PTEN pathways.    What are the clinical features of lentigines?  Lentigines have been classified into several different types depending on what they look like, where they appear on the body, causative factors, and whether they are associated to other diseases or conditions.  Lentigines may be solitary or more often, multiple. Most lentigines are smaller than 5 mm in diameter.    Lentigo simplex  A precursor to junctional naevus   Arises during childhood and early adult life   Found on trunk and limbs   Small brown round or oval macule or thin plaque   Jagged or smooth edge   May have a dry surface   May disappear in time  Solar lentigo  A precursor to seborrhoeic keratosis   Found on chronically sun exposed sites such as hands, face, lower legs   May also follow sunburn to shoulders   Yellow, light or dark brown regular or irregular macule or thin plaque   May have a dry surface   Often has moth-eaten outline   Can slowly enlarge to several centimeters in diameter   May disappear, often through the process known as lichenoid keratosis   When atypical in appearance, may be difficult to distinguish from melanoma in situ  Ink spot lentigo  Also known as reticulated lentigo   Few in number compared to solar lentigines   Follows sunburn in very fair skinned individuals   Dark brown to black irregular ink spot-like macule  PUVA lentigo  Similar to ink spot lentigo but follows photochemotherapy (PUVA)   Location anywhere exposed to PUVA  Tanning bed lentigo  Similar to ink spot lentigo but follows indoor tanning   Location anywhere exposed to tanning bed  Radiation lentigo  Occurs in site of irradiation (accidental or therapeutic)   Associated with late-stage radiation dermatitis: epidermal atrophy, subcutaneous fibrosis, keratosis, telangiectasias  Melanotic macule  Mucosal  surfaces or adjacent glabrous skin eg lip, vulva, penis, anus   Light to dark brown   Also called mucosal melanosis  Generalised lentigines  Found on any exposed or covered site from early childhood   Small macules may merge to form larger patches   Not associated with a syndrome   Also called lentigines profusa, multiple lentigines  Agminated lentigines  Naevoid eruption of lentigos confined to a single segmental area   Sharp demarcation in midline   May have associated neurological and developmental abnormalities  Patterned lentigines  Inherited tendency to lentigines on face, lips, buttocks, palms, soles   Recognised mainly in people of  ethnicity  Centrofacial neurodysraphic lentiginosis  Associated with mental retardation  Lentiginosis syndromes  Syndromes include LEOPARD/Stanislaw, Peutz-Jeghers, Laugier-Hunziker, Moynahan, Xeroderma pigmentosum, myxoma syndromes (BAUMANN, NAME, Darby), Ruvalcaba-Myhre-Sorto, Bannayan-Zonnana syndrome, Cowden disease (multiple hamartoma syndrome )   Inheritance is autosomal dominant; sporadic cases common   Widespread lentigines present at birth or arise in early childhood   Associated with neural, endocrine, and mesenchymal tumors    How is the diagnosis made?  Lentigines are usually diagnosed clinically by their typical appearance. Concern regarding possibility of melanoma may lead to:  Dermatoscopy   Diagnostic excision biopsy    Histopathology of a lentigo shows:  Thickened epidermis   An increased number of melanocytes along the basal layer of epidermis   Unlike junctional melanocytic naevus, there are no nests of melanocytes   Increased melanin pigment within the keratinocytes   Additional features depending on type of lentigo    In contrast, an ephelis (freckle) shows sun-induced increased melanin within the keratinocytes, without an increase in number of cells.  What is the treatment for lentigines?  Most lentigines are left alone. Attempts to lighten them may not be  successful. The following approaches are used:  SPF 50+ broad-spectrum sunscreen   Hydroquinone bleaching cream   Alpha hydroxy acids   Vitamin C   Retinoids   Azelaic acid   Chemical peels  Individual lesions can be permanently removed using:  Cryotherapy   Intense pulsed light   Pigment lasers    How can lentigines be prevented?  Lentigines associated with exposure ultraviolet radiation can be prevented by very careful sun protection. Clothing is more successful at preventing new lentigines than are sunscreens.    What is the outlook for lentigines?  Lentigines usually persist. They may increase in number with age and sun exposure. Some in sun-protected sites may fade and disappear.

## 2025-03-03 ENCOUNTER — OFFICE VISIT (OUTPATIENT)
Dept: FAMILY MEDICINE CLINIC | Facility: HOSPITAL | Age: 50
End: 2025-03-03
Payer: COMMERCIAL

## 2025-03-03 VITALS
DIASTOLIC BLOOD PRESSURE: 88 MMHG | BODY MASS INDEX: 47.42 KG/M2 | HEIGHT: 65 IN | OXYGEN SATURATION: 97 % | SYSTOLIC BLOOD PRESSURE: 110 MMHG | WEIGHT: 284.6 LBS | HEART RATE: 94 BPM

## 2025-03-03 DIAGNOSIS — Z13.6 SCREENING FOR CARDIOVASCULAR CONDITION: ICD-10-CM

## 2025-03-03 DIAGNOSIS — F41.1 GENERALIZED ANXIETY DISORDER: ICD-10-CM

## 2025-03-03 DIAGNOSIS — Z00.00 ANNUAL PHYSICAL EXAM: ICD-10-CM

## 2025-03-03 DIAGNOSIS — Z12.31 ENCOUNTER FOR SCREENING MAMMOGRAM FOR BREAST CANCER: ICD-10-CM

## 2025-03-03 DIAGNOSIS — F32.0 MILD MAJOR DEPRESSION, SINGLE EPISODE (HCC): ICD-10-CM

## 2025-03-03 DIAGNOSIS — Z12.11 SCREENING FOR COLON CANCER: Primary | ICD-10-CM

## 2025-03-03 PROBLEM — F32.9 MAJOR DEPRESSIVE DISORDER WITH SINGLE EPISODE: Status: ACTIVE | Noted: 2025-03-03

## 2025-03-03 PROCEDURE — 99214 OFFICE O/P EST MOD 30 MIN: CPT | Performed by: FAMILY MEDICINE

## 2025-03-03 PROCEDURE — 99396 PREV VISIT EST AGE 40-64: CPT | Performed by: FAMILY MEDICINE

## 2025-03-03 RX ORDER — CITALOPRAM HYDROBROMIDE 40 MG/1
40 TABLET ORAL DAILY
Qty: 100 TABLET | Refills: 1 | Status: SHIPPED | OUTPATIENT
Start: 2025-03-03

## 2025-03-03 NOTE — ASSESSMENT & PLAN NOTE
Has had increased anxiety as well as depression.  Increase stressors.    Restart Celexa.  Advised starting at 20 mg daily for a week and then increase back to 40 mg daily which she was on for a while.      Orders:    citalopram (CeleXA) 40 mg tablet; Take 1 tablet (40 mg total) by mouth daily    CBC; Future    Comprehensive metabolic panel; Future    TSH, 3rd generation with Free T4 reflex; Future

## 2025-03-03 NOTE — ASSESSMENT & PLAN NOTE
Orders:    citalopram (CeleXA) 40 mg tablet; Take 1 tablet (40 mg total) by mouth daily    CBC; Future    Comprehensive metabolic panel; Future    TSH, 3rd generation with Free T4 reflex; Future

## 2025-03-03 NOTE — PROGRESS NOTES
Adult Annual Physical  Name: Jenn Roman      : 1975      MRN: [id number]  Encounter Provider: Morales Morris MD  Encounter Date: 3/3/2025   Encounter department: [facility] CARE [address]     Assessment & Plan  Screening for colon cancer    Orders:    Ambulatory Referral to Gastroenterology; Future    Encounter for screening mammogram for breast cancer    Orders:    Mammo screening bilateral w 3d and cad; Future    Generalized anxiety disorder    Orders:    citalopram (CeleXA) 40 mg tablet; Take 1 tablet (40 mg total) by mouth daily    CBC; Future    Comprehensive metabolic panel; Future    TSH, 3rd generation with Free T4 reflex; Future    Mild major depression, single episode (HCC)  Depression Screening Follow-up Plan: Patient's depression screening was positive with a PHQ-2 score of 5. Their PHQ-9 score was 11. Patient assessed for underlying major depression. They have no active suicidal ideations. Brief counseling provided and recommend additional follow-up/re-evaluation next office visit.  Orders:    citalopram (CeleXA) 40 mg tablet; Take 1 tablet (40 mg total) by mouth daily    CBC; Future    Comprehensive metabolic panel; Future    TSH, 3rd generation with Free T4 reflex; Future    Screening for cardiovascular condition    Orders:    Lipid Panel with Direct LDL reflex; Future    Annual physical exam           Immunizations and preventive care screenings were discussed with patient today. Appropriate education was printed on patient's after visit summary.    Counseling:  Alcohol/drug use: discussed moderation in alcohol intake, the recommendations for healthy alcohol use, and avoidance of illicit drug use.  Dental Health: discussed importance of regular tooth brushing, flossing, and dental visits.  Exercise: the importance of regular exercise/physical activity was discussed. Recommend exercise 3-5 times per week for at least 30 minutes.          History of Present Illness      Adult Annual Physical:  Patient presents for annual physical.     Diet and Physical Activity:  - Diet/Nutrition:. Making improvements.  - Exercise: no formal exercise.    Depression Screening:  - PHQ-2 Score: 5  - PHQ-9 Score: 11    General Health:  - Sleep: sleeps well.  - Hearing: normal hearing right ear and normal hearing left ear.  - Vision: wears contacts and most recent eye exam > 1 year ago. We make an appointment.  - Dental: no dental visits for > 1 year. Will be making appointment.    /GYN Health:  - Follows with GYN: no.   - Menopause: perimenopausal.   - History of STDs: no    Advanced Care Planning:  - Has an advanced directive?: no    - Has a durable medical POA?: no    - ACP document given to patient?: yes      Review of Systems   Constitutional: Negative.  Negative for activity change, appetite change, chills, diaphoresis, fatigue and fever.   HENT:  Negative for congestion, facial swelling and sore throat.    Respiratory: Negative.  Negative for apnea, cough, chest tightness and shortness of breath.    Cardiovascular: Negative.  Negative for chest pain and palpitations.   Gastrointestinal: Negative.  Negative for abdominal distention, abdominal pain, blood in stool, constipation, diarrhea and nausea.   Genitourinary: Negative.  Negative for difficulty urinating, dysuria, flank pain and frequency.     Current Outpatient Medications on File Prior to Visit   Medication Sig Dispense Refill    [DISCONTINUED] citalopram (CeleXA) 40 mg tablet Take 1 tablet (40 mg total) by mouth daily 90 tablet 1    aspirin (ECOTRIN LOW STRENGTH) 81 mg EC tablet Take 1 tablet (81 mg total) by mouth 2 (two) times a day 60 tablet 0     No current facility-administered medications on file prior to visit.      Social History     Tobacco Use    Smoking status: Never     Passive exposure: Never    Smokeless tobacco: Never   Vaping Use    Vaping status: Never Used   Substance and Sexual Activity    Alcohol use: Not  "Currently     Comment: Socially    Drug use: Never    Sexual activity: Not on file       Objective   /88 (BP Location: Left arm, Patient Position: Sitting)   Pulse 94   Ht 5' 4.57\" (1.64 m)   Wt 129 kg (284 lb 9.6 oz)   SpO2 97%   BMI 48.00 kg/m²     Physical Exam  Vitals and nursing note reviewed.   Constitutional:       General: She is not in acute distress.     Appearance: Normal appearance. She is well-developed.   HENT:      Head: Normocephalic and atraumatic.      Right Ear: External ear normal.      Left Ear: External ear normal.      Nose: Nose normal.      Mouth/Throat:      Mouth: Mucous membranes are moist.   Eyes:      Conjunctiva/sclera: Conjunctivae normal.      Pupils: Pupils are equal, round, and reactive to light.   Cardiovascular:      Rate and Rhythm: Normal rate and regular rhythm.      Heart sounds: Normal heart sounds.   Pulmonary:      Effort: Pulmonary effort is normal.      Breath sounds: Normal breath sounds.   Abdominal:      General: Bowel sounds are normal.      Palpations: Abdomen is soft.   Musculoskeletal:      Cervical back: Normal range of motion and neck supple.   Skin:     General: Skin is warm and dry.   Neurological:      General: No focal deficit present.      Mental Status: She is alert and oriented to person, place, and time.   Psychiatric:         Mood and Affect: Mood normal.         Behavior: Behavior normal.         "

## 2025-03-03 NOTE — PROGRESS NOTES
Name: Jenn Roman      : 1975      MRN: [id number]  Encounter Provider: Morales Morris MD  Encounter Date: 3/3/2025   Encounter department: [facility] CARE [address]   :  Assessment & Plan  Screening for colon cancer  Addition to screening she has had some irregular bowels.  Will need a colonoscopy.  Orders:    Ambulatory Referral to Gastroenterology; Future    Encounter for screening mammogram for breast cancer  Advised regarding mammograms.  Orders:    Mammo screening bilateral w 3d and cad; Future    Generalized anxiety disorder  Has had increased anxiety as well as depression.  Increase stressors.    Restart Celexa.  Advised starting at 20 mg daily for a week and then increase back to 40 mg daily which she was on for a while.      Orders:    citalopram (CeleXA) 40 mg tablet; Take 1 tablet (40 mg total) by mouth daily    CBC; Future    Comprehensive metabolic panel; Future    TSH, 3rd generation with Free T4 reflex; Future    Mild major depression, single episode (HCC)  Depression Screening Follow-up Plan: Patient's depression screening was positive with a PHQ-2 score of 5. Their PHQ-9 score was 11. Patient assessed for underlying major depression. They have no active suicidal ideations. Brief counseling provided and recommend additional follow-up/re-evaluation next office visit.      Restart Celexa.  No suicidal ideation homicidal ideation.  Monitor symptoms.    She will reach out but prefers to follow-up in 6 months.      Orders:    citalopram (CeleXA) 40 mg tablet; Take 1 tablet (40 mg total) by mouth daily    CBC; Future    Comprehensive metabolic panel; Future    TSH, 3rd generation with Free T4 reflex; Future    Screening for cardiovascular condition    Orders:    Lipid Panel with Direct LDL reflex; Future           History of Present Illness   Jenn is here for a physical.  Has underlying depression and anxiety.  Has been off of citalopram for a while.  Increase stressors.  A  "lot of anxiety and history of panic attacks.  We reviewed PHQ-9.  Confirming depression.  No suicidal ideation, no homicidal ideation, feeling safe.    Will be following up with orthopedics regarding her chronic knee pain/osteoarthritis.      Review of Systems   Constitutional: Negative.  Negative for activity change, appetite change, chills, diaphoresis, fatigue and fever.   HENT:  Negative for congestion, facial swelling and sore throat.    Respiratory: Negative.  Negative for apnea, cough, chest tightness and shortness of breath.    Cardiovascular: Negative.  Negative for chest pain and palpitations.   Gastrointestinal: Negative.  Negative for abdominal distention, abdominal pain, blood in stool, constipation, diarrhea and nausea.   Genitourinary: Negative.  Negative for difficulty urinating, dysuria, flank pain and frequency.       Objective   /88 (BP Location: Left arm, Patient Position: Sitting)   Pulse 94   Ht 5' 4.57\" (1.64 m)   Wt 129 kg (284 lb 9.6 oz)   SpO2 97%   BMI 48.00 kg/m²      Physical Exam  Vitals and nursing note reviewed.   Constitutional:       General: She is not in acute distress.     Appearance: Normal appearance. She is well-developed.   HENT:      Head: Normocephalic and atraumatic.      Right Ear: Tympanic membrane, ear canal and external ear normal.      Left Ear: Tympanic membrane, ear canal and external ear normal.      Nose: Nose normal.      Mouth/Throat:      Mouth: Mucous membranes are moist.   Eyes:      Conjunctiva/sclera: Conjunctivae normal.      Pupils: Pupils are equal, round, and reactive to light.   Cardiovascular:      Rate and Rhythm: Normal rate and regular rhythm.      Heart sounds: Normal heart sounds.   Pulmonary:      Effort: Pulmonary effort is normal.      Breath sounds: Normal breath sounds.   Abdominal:      General: Bowel sounds are normal.      Palpations: Abdomen is soft.   Musculoskeletal:      Cervical back: Normal range of motion and neck " supple.   Skin:     General: Skin is warm and dry.   Neurological:      General: No focal deficit present.      Mental Status: She is alert and oriented to person, place, and time.   Psychiatric:         Mood and Affect: Mood normal.         Behavior: Behavior normal.       Depression Screening Follow-up Plan: Patient's depression screening was positive with a PHQ-2 score of 5. Their PHQ-9 score was 11. Patient assessed for underlying major depression. They have no active suicidal ideations. Brief counseling provided and recommend additional follow-up/re-evaluation next office visit.

## 2025-03-03 NOTE — PROGRESS NOTES
"Name: Jenn Roman      : 1975      MRN: [id number]  Encounter Provider: Morales Morris MD  Encounter Date: 3/3/2025   Encounter department: [facility] CARE [address]   :  Assessment & Plan  Screening for colon cancer    Orders:  •  Ambulatory Referral to Gastroenterology; Future    Encounter for screening mammogram for breast cancer    Orders:  •  Mammo screening bilateral w 3d and cad; Future    Generalized anxiety disorder                History of Present Illness {?Quick Links Encounters * My Last Note * Last Note in Specialty * Snapshot * Since Last Visit * History :48228}  HPI  Review of Systems    Objective {?Quick Links Trend Vitals * Enter New Vitals * Results Review * Timeline (Adult) * Labs * Imaging * Cardiology * Procedures * Lung Cancer Screening * Surgical eConsent :96874}  /88 (BP Location: Left arm, Patient Position: Sitting)   Pulse 94   Ht 5' 4.57\" (1.64 m)   Wt 129 kg (284 lb 9.6 oz)   SpO2 97%   BMI 48.00 kg/m²      Physical Exam  {Administrative / Billing Section (Optional):10767}  "

## 2025-03-03 NOTE — PATIENT INSTRUCTIONS
"Patient Education     Routine physical for adults   The Basics   Written by the doctors and editors at [facility]   What is a physical? -- A physical is a routine visit, or \"check-up,\" with your doctor. You might also hear it called a \"wellness visit\" or \"preventive visit.\"  During each visit, the doctor will:   Ask about your physical and mental health   Ask about your habits, behaviors, and lifestyle   Do an exam   Give you vaccines if needed   Talk to you about any medicines you take   Give advice about your health   Answer your questions  Getting regular check-ups is an important part of taking care of your health. It can help your doctor find and treat any problems you have. But it's also important for preventing health problems.  A routine physical is different from a \"sick visit.\" A sick visit is when you see a doctor because of a health concern or problem. Since physicals are scheduled ahead of time, you can think about what you want to ask the doctor.  How often should I get a physical? -- It depends on your age and health. In general, for people age 21 years and older:   If you are younger than 50 years, you might be able to get a physical every 3 years.   If you are 50 years or older, your doctor might recommend a physical every year.  If you have an ongoing health condition, like diabetes or high blood pressure, your doctor will probably want to see you more often.  What happens during a physical? -- In general, each visit will include:   Physical exam - The doctor or nurse will check your height, weight, heart rate, and blood pressure. They will also look at your eyes and ears. They will ask about how you are feeling and whether you have any symptoms that bother you.   Medicines - It's a good idea to bring a list of all the medicines you take to each doctor visit. Your doctor will talk to you about your medicines and answer any questions. Tell them if you are having any side effects that bother you. You " "should also tell them if you are having trouble paying for any of your medicines.   Habits and behaviors - This includes:   Your diet   Your exercise habits   Whether you smoke, drink alcohol, or use drugs   Whether you are sexually active   Whether you feel safe at home  Your doctor will talk to you about things you can do to improve your health and lower your risk of health problems. They will also offer help and support. For example, if you want to quit smoking, they can give you advice and might prescribe medicines. If you want to improve your diet or get more physical activity, they can help you with this, too.   Lab tests, if needed - The tests you get will depend on your age and situation. For example, your doctor might want to check your:   Cholesterol   Blood sugar   Iron level   Vaccines - The recommended vaccines will depend on your age, health, and what vaccines you already had. Vaccines are very important because they can prevent certain serious or deadly infections.   Discussion of screening - \"Screening\" means checking for diseases or other health problems before they cause symptoms. Your doctor can recommend screening based on your age, risk, and preferences. This might include tests to check for:   Cancer, such as breast, prostate, cervical, ovarian, colorectal, prostate, lung, or skin cancer   Sexually transmitted infections, such as chlamydia and gonorrhea   Mental health conditions like depression and anxiety  Your doctor will talk to you about the different types of screening tests. They can help you decide which screenings to have. They can also explain what the results might mean.   Answering questions - The physical is a good time to ask the doctor or nurse questions about your health. If needed, they can refer you to other doctors or specialists, too.  Adults older than 65 years often need other care, too. As you get older, your doctor will talk to you about:   How to prevent falling at " home   Hearing or vision tests   Memory testing   How to take your medicines safely   Making sure that you have the help and support you need at home  All topics are updated as new evidence becomes available and our peer review process is complete.  This topic retrieved from [employer] on: May 02, 2024.  Topic 210844 Version 1.0  Release: 32.4.3 - C32.122  © 2024 UpToDate, Inc. and/or its affiliates. All rights reserved.  Consumer Information Use and Disclaimer   Disclaimer: This generalized information is a limited summary of diagnosis, treatment, and/or medication information. It is not meant to be comprehensive and should be used as a tool to help the user understand and/or assess potential diagnostic and treatment options. It does NOT include all information about conditions, treatments, medications, side effects, or risks that may apply to a specific patient. It is not intended to be medical advice or a substitute for the medical advice, diagnosis, or treatment of a health care provider based on the health care provider's examination and assessment of a patient's specific and unique circumstances. Patients must speak with a health care provider for complete information about their health, medical questions, and treatment options, including any risks or benefits regarding use of medications. This information does not endorse any treatments or medications as safe, effective, or approved for treating a specific patient. UpToDate, Inc. and its affiliates disclaim any warranty or liability relating to this information or the use thereof.The use of this information is governed by the Terms of Use, available at https://www.woltersTexertuwer.com/en/know/clinical-effectiveness-terms. 2024© UpToDate, Inc. and its affiliates and/or licensors. All rights reserved.  Copyright   © 2024 Up[employer]Date, Inc. and/or its affiliates. All rights reserved.

## 2025-04-24 ENCOUNTER — APPOINTMENT (OUTPATIENT)
Dept: RADIOLOGY | Facility: CLINIC | Age: 50
End: 2025-04-24
Attending: ORTHOPAEDIC SURGERY
Payer: COMMERCIAL

## 2025-04-24 ENCOUNTER — OFFICE VISIT (OUTPATIENT)
Dept: OBGYN CLINIC | Facility: CLINIC | Age: 50
End: 2025-04-24
Payer: COMMERCIAL

## 2025-04-24 VITALS — BODY MASS INDEX: 47.65 KG/M2 | WEIGHT: 286 LBS | HEIGHT: 65 IN

## 2025-04-24 DIAGNOSIS — M25.561 ACUTE PAIN OF RIGHT KNEE: ICD-10-CM

## 2025-04-24 DIAGNOSIS — M17.11 PRIMARY OSTEOARTHRITIS OF RIGHT KNEE: Primary | ICD-10-CM

## 2025-04-24 PROCEDURE — 99213 OFFICE O/P EST LOW 20 MIN: CPT | Performed by: ORTHOPAEDIC SURGERY

## 2025-04-24 PROCEDURE — 73564 X-RAY EXAM KNEE 4 OR MORE: CPT

## 2025-04-24 NOTE — PROGRESS NOTES
Assessment:     1. Primary osteoarthritis of right knee    2. Acute pain of right knee        Plan:     Problem List Items Addressed This Visit          Musculoskeletal and Integument    Primary osteoarthritis of right knee - Primary    Findings are consistent with right knee primary osteoarthritis. Findings and treatment options discussed with patient. X-rays of the right knee were reviewed and discussed with patient. Discussed prognosis of condition with patient. We discussed this could be more of a soft tissue injury and could take up to 6-8 weeks to resolve. Discussed if patient continues to have achy pain in the knee in 6-8 weeks we could consider a cortisone injection. Discussed going to physical therapy, patient deferred as she has done physical therapy in the past. Patient will perform at home exercise program. Short hinged knee brace given at today's visit. Can use NSAIDs and topical Voltaren gel or Aspercreme as needed for pain.  All patient's questions were answered to her satisfaction.  This note is created using dictation transcription.  It may contain typographical errors, grammatical errors, improperly dictated words, background noise and other errors.         Relevant Orders    Durable Medical Equipment       Surgery/Wound/Pain    Acute pain of right knee    Relevant Orders    XR knee 4+ vw right injury    Durable Medical Equipment      Subjective:     Patient ID: Jenn Roman is a 49 y.o. female.  Chief Complaint:  49 y.o. female presents today for follow up for right knee primary osteoarthritis. She was last seen on 4/30/2024 and received a cortisone injection. She reports the cortisone injection did provide her with good symptom relief. She notes 2 weeks ago she had a slip in her yard that caused her right knee to fold under her, landing directly on the right knee. She has been taking NSAIDs to help with the pain. She has diffuse tenderness throughout the knee. She denies any catching, locking or  instability.    Allergy:  No Known Allergies  Medications:  all current active meds have been reviewed  Past Medical History:  Past Medical History:   Diagnosis Date    Acute pain of right knee 2018    Anxiety     Dental disorder     last assessed: Dec 4, 2014    Effusion of right knee 2019    Generalized anxiety disorder 2018     Past Surgical History:  Past Surgical History:   Procedure Laterality Date     SECTION      x2    KS ARTHRS KNEE W/MENISCECTOMY MED&LAT W/SHAVING Right 2022    Procedure: MENISCECTOMY LATERAL /MEDIAL-arthroscopy;  Surgeon: Dar Cheng DO;  Location: [facility];  Service: Orthopedics    KS SURGICAL ARTHROSCOPY [place] W/CORACOACRM LIGM RLS Left 2022    Procedure: ARTHROSCOPIC SUBACROMIAL DECOMPRESSION;  Surgeon: Rob Sorto DO;  Location: [facility];  Service: Orthopedics    KS SURGICAL ARTHROSCOPY SHOULDER W/ROTATOR CUFF RPR Left 2022    Procedure: REPAIR ROTATOR CUFF  ARTHROSCOPIC;  Surgeon: Rob Sorto DO;  Location: [facility];  Service: Orthopedics     Family History:  Family History   Problem Relation Age of Onset    No Known Problems Mother     No Known Problems Father     Ovarian cysts Sister     No Known Problems Sister     No Known Problems Daughter     Ovarian cancer Maternal Grandmother         60's    No Known Problems Maternal Grandfather     No Known Problems Paternal Grandmother     No Known Problems Paternal Grandfather     No Known Problems Maternal Aunt     No Known Problems Maternal Aunt     No Known Problems Maternal Aunt     No Known Problems Maternal Aunt      Social History:  Social History     Substance and Sexual Activity   Alcohol Use Not Currently    Comment: Socially     Social History     Substance and Sexual Activity   Drug Use Never     Social History     Tobacco Use   Smoking Status Never    Passive exposure: Never   Smokeless Tobacco Never     Review of Systems   Constitutional:  Negative for chills  "and fever.   HENT:  Negative for ear pain and sore throat.    Eyes:  Negative for pain and visual disturbance.   Respiratory:  Negative for cough and shortness of breath.    Cardiovascular:  Negative for chest pain and palpitations.   Gastrointestinal:  Negative for abdominal pain and vomiting.   Genitourinary:  Negative for dysuria and hematuria.   Musculoskeletal:  Positive for arthralgias (Right knee) and gait problem (Antalgic). Negative for back pain.   Skin:  Negative for color change and rash.   Neurological:  Negative for seizures and syncope.   Psychiatric/Behavioral: Negative.     All other systems reviewed and are negative.        Objective:  BP Readings from Last 1 Encounters:   03/03/25 110/88      Wt Readings from Last 1 Encounters:   04/24/25 130 kg (286 lb)      BMI:   Estimated body mass index is 48.23 kg/m² as calculated from the following:    Height as of this encounter: 5' 4.57\" (1.64 m).    Weight as of this encounter: 130 kg (286 lb).  BSA:   Estimated body surface area is 2.29 meters squared as calculated from the following:    Height as of this encounter: 5' 4.57\" (1.64 m).    Weight as of this encounter: 130 kg (286 lb).   Physical Exam  Vitals and nursing note reviewed.   Constitutional:       Appearance: She is well-developed. She is obese.   HENT:      Head: Normocephalic and atraumatic.      Right Ear: External ear normal.      Left Ear: External ear normal.   Eyes:      General: No scleral icterus.     Extraocular Movements: Extraocular movements intact.      Conjunctiva/sclera: Conjunctivae normal.   Pulmonary:      Effort: Pulmonary effort is normal. No respiratory distress.   Musculoskeletal:      Cervical back: Neck supple.      Right knee: No effusion.      Instability Tests: Medial Jose test negative and lateral Jose test negative.      Comments: See Ortho exam   Skin:     General: Skin is warm and dry.   Neurological:      General: No focal deficit present.      Mental " Status: She is alert and oriented to person, place, and time.      Deep Tendon Reflexes: Reflexes are normal and symmetric.   Psychiatric:         Mood and Affect: Mood normal.         Behavior: Behavior normal.       Right Knee Exam     Tenderness   The patient is experiencing tenderness in the medial joint line and lateral joint line (Anterior).    Range of Motion   Extension:  0   Flexion:  110     Tests   Jose:  Medial - negative Lateral - negative  Varus: negative Valgus: negative  Patellar apprehension: negative    Other   Erythema: absent  Scars: absent  Sensation: normal  Pulse: present  Swelling: none  Effusion: no effusion present    Comments:  Patellofemoral crepitus with knee motion            I have personally reviewed pertinent films in PACS and my interpretation is moderate lateral compartment degenerative changes with joint space narrowing, subchondral sclerosis and osteophyte formation.    Scribe Attestation      I,:  Henry Lane am acting as a scribe while in the presence of the attending physician.:       I,:  Brady Gerard MD personally performed the services described in this documentation    as scribed in my presence.:

## 2025-04-24 NOTE — ASSESSMENT & PLAN NOTE
Findings are consistent with right knee primary osteoarthritis. Findings and treatment options discussed with patient. X-rays of the right knee were reviewed and discussed with patient. Discussed prognosis of condition with patient. We discussed this could be more of a soft tissue injury and could take up to 6-8 weeks to resolve. Discussed if patient continues to have achy pain in the knee in 6-8 weeks we could consider a cortisone injection. Discussed going to physical therapy, patient deferred as she has done physical therapy in the past. Patient will perform at home exercise program. Short hinged knee brace given at today's visit. Can use NSAIDs and topical Voltaren gel or Aspercreme as needed for pain.  All patient's questions were answered to her satisfaction.  This note is created using dictation transcription.  It may contain typographical errors, grammatical errors, improperly dictated words, background noise and other errors.

## 2025-08-13 ENCOUNTER — OFFICE VISIT (OUTPATIENT)
Dept: FAMILY MEDICINE CLINIC | Facility: HOSPITAL | Age: 50
End: 2025-08-13
Payer: COMMERCIAL

## 2025-08-13 ENCOUNTER — APPOINTMENT (OUTPATIENT)
Dept: LAB | Facility: HOSPITAL | Age: 50
End: 2025-08-13
Payer: COMMERCIAL

## 2025-08-13 ENCOUNTER — HOSPITAL ENCOUNTER (OUTPATIENT)
Dept: RADIOLOGY | Facility: HOSPITAL | Age: 50
Discharge: HOME | End: 2025-08-13
Payer: COMMERCIAL

## 2025-08-13 PROCEDURE — 71046 X-RAY EXAM CHEST 2 VIEWS: CPT

## 2025-08-14 ENCOUNTER — TELEPHONE (OUTPATIENT)
Age: 50
End: 2025-08-14

## (undated) DEVICE — INTENDED FOR TISSUE SEPARATION, AND OTHER PROCEDURES THAT REQUIRE A SHARP SURGICAL BLADE TO PUNCTURE OR CUT.: Brand: BARD-PARKER ® CARBON RIB-BACK BLADES

## (undated) DEVICE — NEEDLE SPINAL18G X 3.5 IN QUINCKE

## (undated) DEVICE — T-MAX DISPOSABLE FACE MASK 8 PER BOX

## (undated) DEVICE — NEEDLE 18 G X 1 1/2

## (undated) DEVICE — SYRINGE 3ML LL

## (undated) DEVICE — SYRINGE 30ML LL

## (undated) DEVICE — SURGI KIT INSTRUMENT ORGANIZER

## (undated) DEVICE — ACE WRAP 6 IN UNSTERILE

## (undated) DEVICE — CAST PADDING 6 IN STERILE

## (undated) DEVICE — SURGICAL GOWN, XL SMARTSLEEVE: Brand: CONVERTORS

## (undated) DEVICE — SPONGE SCRUB 4 PCT CHLORHEXIDINE

## (undated) DEVICE — SUT MONOCRYL 4-0 PS-2 27 IN Y426H

## (undated) DEVICE — ABDOMINAL PAD: Brand: DERMACEA

## (undated) DEVICE — BLADE SHAVER EXCALIBUR 4MM 13CM COOLCUT

## (undated) DEVICE — ARTHROSCOPY FLOOR MAT

## (undated) DEVICE — FILTER STRAW 1.7

## (undated) DEVICE — POSITIONER TRIMANO LIMB BEACH CHAIR

## (undated) DEVICE — GLOVE SRG BIOGEL ORTHOPEDIC 8

## (undated) DEVICE — SUT ETHILON 3-0 PS-1 18 IN 1663G

## (undated) DEVICE — GLOVE INDICATOR PI UNDERGLOVE SZ 7 BLUE

## (undated) DEVICE — CUFF TOURNIQUET 34 X 4 IN QUICK CONNECT DISP 1BLA

## (undated) DEVICE — GLOVE SRG BIOGEL 7

## (undated) DEVICE — TUBING SUCTION 5MM X 12 FT

## (undated) DEVICE — TUBING ARTHROSCOPIC WAVE  MAIN PUMP

## (undated) DEVICE — GLOVE SRG BIOGEL 8.5

## (undated) DEVICE — OCCLUSIVE GAUZE STRIP,3% BISMUTH TRIBROMOPHENATE IN PETROLATUM BLEND: Brand: XEROFORM

## (undated) DEVICE — BLADE SHAVER DISSECTOR  4MM 13CM CRV COOLCUT

## (undated) DEVICE — BETHLEHEM UNIV MAJ EXT ,KIT: Brand: CARDINAL HEALTH

## (undated) DEVICE — BANDAGE, ESMARK LF STR 6"X9' (20/CS): Brand: CYPRESS

## (undated) DEVICE — VAPR COOLPULSE 90 ELECTRODE 90 DEGREES SUCTION WITH INTEGRATED HANDPIECE: Brand: VAPR COOLPULSE

## (undated) DEVICE — BURR  OVAL 4MM 13CM 8 FLUTE COOLCUT

## (undated) DEVICE — 3M™ STERI-STRIP™ REINFORCED ADHESIVE SKIN CLOSURES, R1547, 1/2 IN X 4 IN (12 MM X 100 MM), 6 STRIPS/ENVELOPE: Brand: 3M™ STERI-STRIP™

## (undated) DEVICE — PUDDLE VAC

## (undated) DEVICE — CHLORAPREP HI-LITE 26ML ORANGE

## (undated) DEVICE — GLOVE INDICATOR PI UNDERGLOVE SZ 8 BLUE

## (undated) DEVICE — BETHLEHEM UNIV MAJOR ORTHO,KIT: Brand: CARDINAL HEALTH

## (undated) DEVICE — DISPOSABLE OR TOWEL: Brand: CARDINAL HEALTH

## (undated) DEVICE — 3M™ MICROFOAM™ SURGICAL TAPE 4 ROLLS/CARTON 6 CARTONS/CASE 1528-3: Brand: 3M™ MICROFOAM™

## (undated) DEVICE — IMPERVIOUS STOCKINETTE: Brand: DEROYAL

## (undated) DEVICE — NEEDLE 22 G X 1 1/2 SAFETY

## (undated) DEVICE — KERLIX BANDAGE ROLL: Brand: KERLIX

## (undated) DEVICE — 3M™ STERI-DRAPE™ U-DRAPE 1015: Brand: STERI-DRAPE™

## (undated) DEVICE — GAUZE SPONGES,16 PLY: Brand: CURITY

## (undated) DEVICE — GLOVE INDICATOR PI UNDERGLOVE SZ 8.5 BLUE